# Patient Record
Sex: FEMALE | Race: AMERICAN INDIAN OR ALASKA NATIVE
[De-identification: names, ages, dates, MRNs, and addresses within clinical notes are randomized per-mention and may not be internally consistent; named-entity substitution may affect disease eponyms.]

---

## 2020-11-11 ENCOUNTER — HOSPITAL ENCOUNTER (INPATIENT)
Dept: HOSPITAL 5 - ED | Age: 80
LOS: 9 days | Discharge: HOME | DRG: 871 | End: 2020-11-20
Attending: INTERNAL MEDICINE | Admitting: INTERNAL MEDICINE
Payer: MEDICARE

## 2020-11-11 DIAGNOSIS — Z79.899: ICD-10-CM

## 2020-11-11 DIAGNOSIS — J12.89: ICD-10-CM

## 2020-11-11 DIAGNOSIS — J96.01: ICD-10-CM

## 2020-11-11 DIAGNOSIS — M19.90: ICD-10-CM

## 2020-11-11 DIAGNOSIS — A41.89: Primary | ICD-10-CM

## 2020-11-11 DIAGNOSIS — I10: ICD-10-CM

## 2020-11-11 DIAGNOSIS — R65.20: ICD-10-CM

## 2020-11-11 DIAGNOSIS — F03.90: ICD-10-CM

## 2020-11-11 DIAGNOSIS — Z85.3: ICD-10-CM

## 2020-11-11 DIAGNOSIS — U07.1: ICD-10-CM

## 2020-11-11 DIAGNOSIS — Z82.49: ICD-10-CM

## 2020-11-11 DIAGNOSIS — Z86.73: ICD-10-CM

## 2020-11-11 LAB
ALBUMIN SERPL-MCNC: 3.4 G/DL (ref 3.9–5)
ALT SERPL-CCNC: 13 UNITS/L (ref 7–56)
BACTERIA #/AREA URNS HPF: (no result) /HPF
BASOPHILS # (AUTO): 0.1 K/MM3 (ref 0–0.1)
BASOPHILS NFR BLD AUTO: 0.8 % (ref 0–1.8)
BILIRUB UR QL STRIP: (no result)
BLOOD UR QL VISUAL: (no result)
BUN SERPL-MCNC: 19 MG/DL (ref 7–17)
BUN/CREAT SERPL: 19 %
CALCIUM SERPL-MCNC: 8.5 MG/DL (ref 8.4–10.2)
CRP SERPL-MCNC: 8 MG/DL (ref 0–1.3)
EOSINOPHIL # BLD AUTO: 0 K/MM3 (ref 0–0.4)
EOSINOPHIL NFR BLD AUTO: 0 % (ref 0–4.3)
HCT VFR BLD CALC: 32 % (ref 30.3–42.9)
HDLC SERPL-MCNC: 48 MG/DL (ref 40–59)
HEMOLYSIS INDEX: 9
HGB BLD-MCNC: 10.5 GM/DL (ref 10.1–14.3)
LYMPHOCYTES # BLD AUTO: 1.2 K/MM3 (ref 1.2–5.4)
LYMPHOCYTES NFR BLD AUTO: 9.2 % (ref 13.4–35)
MCHC RBC AUTO-ENTMCNC: 33 % (ref 30–34)
MCV RBC AUTO: 91 FL (ref 79–97)
MONOCYTES # (AUTO): 0.9 K/MM3 (ref 0–0.8)
MONOCYTES % (AUTO): 6.8 % (ref 0–7.3)
MUCOUS THREADS #/AREA URNS HPF: (no result) /HPF
PH UR STRIP: 5 [PH] (ref 5–7)
PLATELET # BLD: 276 K/MM3 (ref 140–440)
RBC # BLD AUTO: 3.52 M/MM3 (ref 3.65–5.03)
RBC #/AREA URNS HPF: 3 /HPF (ref 0–6)
UROBILINOGEN UR-MCNC: 2 MG/DL (ref ?–2)
WBC #/AREA URNS HPF: 1 /HPF (ref 0–6)

## 2020-11-11 PROCEDURE — 82962 GLUCOSE BLOOD TEST: CPT

## 2020-11-11 PROCEDURE — 96361 HYDRATE IV INFUSION ADD-ON: CPT

## 2020-11-11 PROCEDURE — 87116 MYCOBACTERIA CULTURE: CPT

## 2020-11-11 PROCEDURE — 86140 C-REACTIVE PROTEIN: CPT

## 2020-11-11 PROCEDURE — 81001 URINALYSIS AUTO W/SCOPE: CPT

## 2020-11-11 PROCEDURE — 71250 CT THORAX DX C-: CPT

## 2020-11-11 PROCEDURE — 83880 ASSAY OF NATRIURETIC PEPTIDE: CPT

## 2020-11-11 PROCEDURE — 82728 ASSAY OF FERRITIN: CPT

## 2020-11-11 PROCEDURE — 70450 CT HEAD/BRAIN W/O DYE: CPT

## 2020-11-11 PROCEDURE — 80061 LIPID PANEL: CPT

## 2020-11-11 PROCEDURE — U0003 INFECTIOUS AGENT DETECTION BY NUCLEIC ACID (DNA OR RNA); SEVERE ACUTE RESPIRATORY SYNDROME CORONAVIRUS 2 (SARS-COV-2) (CORONAVIRUS DISEASE [COVID-19]), AMPLIFIED PROBE TECHNIQUE, MAKING USE OF HIGH THROUGHPUT TECHNOLOGIES AS DESCRIBED BY CMS-2020-01-R: HCPCS

## 2020-11-11 PROCEDURE — 85379 FIBRIN DEGRADATION QUANT: CPT

## 2020-11-11 PROCEDURE — 85025 COMPLETE CBC W/AUTO DIFF WBC: CPT

## 2020-11-11 PROCEDURE — 96368 THER/DIAG CONCURRENT INF: CPT

## 2020-11-11 PROCEDURE — 83615 LACTATE (LD) (LDH) ENZYME: CPT

## 2020-11-11 PROCEDURE — 96375 TX/PRO/DX INJ NEW DRUG ADDON: CPT

## 2020-11-11 PROCEDURE — 80053 COMPREHEN METABOLIC PANEL: CPT

## 2020-11-11 PROCEDURE — 84484 ASSAY OF TROPONIN QUANT: CPT

## 2020-11-11 PROCEDURE — 36415 COLL VENOUS BLD VENIPUNCTURE: CPT

## 2020-11-11 PROCEDURE — 96365 THER/PROPH/DIAG IV INF INIT: CPT

## 2020-11-11 PROCEDURE — 96376 TX/PRO/DX INJ SAME DRUG ADON: CPT

## 2020-11-11 PROCEDURE — 71045 X-RAY EXAM CHEST 1 VIEW: CPT

## 2020-11-11 PROCEDURE — 96367 TX/PROPH/DG ADDL SEQ IV INF: CPT

## 2020-11-11 PROCEDURE — 94760 N-INVAS EAR/PLS OXIMETRY 1: CPT

## 2020-11-11 PROCEDURE — 87040 BLOOD CULTURE FOR BACTERIA: CPT

## 2020-11-11 PROCEDURE — 72125 CT NECK SPINE W/O DYE: CPT

## 2020-11-11 PROCEDURE — 84145 PROCALCITONIN (PCT): CPT

## 2020-11-11 PROCEDURE — 82140 ASSAY OF AMMONIA: CPT

## 2020-11-11 RX ADMIN — AZITHROMYCIN SCH MLS/HR: 500 INJECTION, POWDER, LYOPHILIZED, FOR SOLUTION INTRAVENOUS at 15:43

## 2020-11-11 RX ADMIN — METHYLPREDNISOLONE SODIUM SUCCINATE SCH MG: 40 INJECTION, POWDER, FOR SOLUTION INTRAMUSCULAR; INTRAVENOUS at 14:11

## 2020-11-11 RX ADMIN — METHYLPREDNISOLONE SODIUM SUCCINATE SCH MG: 40 INJECTION, POWDER, FOR SOLUTION INTRAMUSCULAR; INTRAVENOUS at 23:00

## 2020-11-11 RX ADMIN — Medication SCH ML: at 23:00

## 2020-11-11 RX ADMIN — HEPARIN SODIUM SCH UNIT: 5000 INJECTION, SOLUTION INTRAVENOUS; SUBCUTANEOUS at 23:00

## 2020-11-11 NOTE — CAT SCAN REPORT
CT CERVICAL SPINE WITHOUT CONTRAST



INDICATION / CLINICAL INFORMATION:

Patient fell sustaining neck injury. Altered mental status.



TECHNIQUE:

Axial CT images were obtained through the cervical spine. Sagittal and coronal reformatted images wer
e produced. All CT scans at this location are performed using CT dose reduction for ALARA by means of
 automated exposure control. 



COMPARISON:

None available.



FINDINGS:



ALIGNMENT: There is a mild rightward curvature of the cervical spine without definite scoliosis. Loss
 of the normal cervical lordosis is noted. There is no indication of traumatic subluxation.



VERTEBRAE: No evidence of fracture. Prominent anterior osteophyte formation is observed at the C5, C6
 and C7 vertebrae. Milder posterior osteophyte formation is also observed at the same levels.



DISC SPACES: Loss of disc height is demonstrated at the C5-6 and C6-7 levels.



DEGENERATIVE CHANGES: Mild loss of disc height, anterior osteophyte formation, mild posterior osteoph
yte formation and right worse than left uncovertebral arthropathy are observed at the C5-6 and C6-7 l
evels. Bilateral neuroforaminal narrowing is seen at these locations. Central spinal canal is adequat
ely maintained.





CRANIOCERVICAL JUNCTION:No significant abnormality.



SPINAL CANAL: Central spinal canal is adequately maintained throughout.



PARASPINAL SOFT TISSUES: No significant abnormality. 



ADDITIONAL FINDINGS: None.



LUNG APICES: Lung apices are largely excluded from this study.



IMPRESSION:

1. No indication of fracture or traumatic subluxation.

2. Degenerative changes at C5-6 and C6-7 levels as described above.









Signer Name: Jason Up MD 

Signed: 11/11/2020 11:52 AM

Workstation Name: [id number]

## 2020-11-11 NOTE — XRAY REPORT
CHEST 1 VIEW 



INDICATION:  fever hypoxia.



COMPARISON:  None



FINDINGS:

Support devices: None. 



Heart: Borderline to mild cardiomegaly 

Lungs/Pleura: Mild airspace opacity is identified throughout the right lung and left lower lobe. This
 could represent infiltrates or congestive changes. No large pleural effusion or pneumothorax. 



Additional findings: None.



IMPRESSION:

 Borderline to mild cardiomegaly. Bilateral congestive changes or infiltrates as described. Viral inf
ection is not excluded.



Signer Name: Bolivar Corral Jr, MD 

Signed: 11/11/2020 11:42 AM

Workstation Name: [id number]

## 2020-11-11 NOTE — CAT SCAN REPORT
CT CHEST WITHOUT CONTRAST



INDICATION / CLINICAL INFORMATION:

fever hypoxia.



TECHNIQUE:

Axial CT images were obtained through the chest without contrast. All CT scans at this location are p
erformed using CT dose reduction for ALARA by means of automated exposure control. 



COMPARISON:

Chest radiograph from same day.



FINDINGS:



THORACIC AORTA: Mild atherosclerotic calcification without acute abnormality. 

HEART: The heart is enlarged. No pericardial effusion. Coronary artery calcifications.

MEDIASTINUM / ROBER: Evaluation for hilar adenopathy is limited without venous contrast. Scattered mil
dly enlarged mediastinal lymph nodes are likely reactive.

LUNGS/PLEURA: There are small bilateral pleural effusions with dense bilateral airspace consolidation
s, slightly more pronounced on the right. There is diffuse interlobular septal thickening. No pneumot
horax.

ADDITIONAL CHEST FINDINGS: None.



UPPER ABDOMEN: No significant abnormality.



SKELETAL SYSTEM: Incidentally, there is a 2.3 x 1.5 cm geographic lytic lesion within the anterior pa
rasymphyseal mandible, just to the left of midline. This is incompletely imaged on current study.



IMPRESSION:



1. Findings most compatible with CHF/fluid overload with pulmonary edema and small bilateral pleural 
effusions. Superimposed pneumonia is unable to be excluded.

2. Incidentally noted 2.3 cm lytic lesion within the anterior mandible. This is incompletely imaged a
nd may represent a dentigerous cyst. Recommend comparison to outside imaging to assess stability of t
his finding. If no prior imaging is available for comparison, consider further evaluation with MRI.



Signer Name: Jeferson Barrera MD 

Signed: 11/11/2020 12:11 PM

Workstation Name: [id number]

## 2020-11-11 NOTE — CAT SCAN REPORT
CT HEAD WITHOUT CONTRAST



INDICATION / CLINICAL INFORMATION:  fall altered mental status.



TECHNIQUE: Axial imaging performed from the skull apex through the skull base without the use of cont
rast.  Sagittal and coronal reformatted images.  All CT scans at this location are performed using CT
 dose reduction for ALARA by means of automated exposure control. 



COMPARISON: None available.



FINDINGS:



CEREBRAL PARENCHYMA: Mild diffuse volume loss and mild to moderate chronic white matter changes are n
oted. No acute parenchymal abnormality is appreciated. Chronic focal infarct in the right subinsular 
region measures 2.2 x 0.7 cm. Chronic infarct in the medial right occipital lobe measures 3.0 x 1.7 c
m.

HEMORRHAGE: None.

EXTRA-AXIAL SPACES: Normal in size and morphology for the patient's age.

VENTRICULAR SYSTEM: Normal in size and morphology for the patient's age.

MIDLINE SHIFT OR HERNIATION: None.



CEREBELLUM / BRAINSTEM: No significant abnormality.



CALVARIUM: No significant abnormality.

ORBITS: Normal as visualized.

PARANASAL SINUSES / MASTOID AIR CELLS: Normal as visualized.

SOFT TISSUES of HEAD: No significant abnormality.



ADDITIONAL FINDINGS: None.



IMPRESSION:

No acute intracranial abnormality. Volume loss and chronic white matter changes. Chronic infarcts in 
the right subinsular region and medial right occipital lobe.



Signer Name: Bolivar Corral Jr, MD 

Signed: 11/11/2020 11:19 AM

Workstation Name: [id number]

## 2020-11-11 NOTE — EMERGENCY DEPARTMENT REPORT
ED Altered Mental Status HPI





- General


Chief Complaint: Altered Mental Status


Stated Complaint: STROKE


PUI?: Yes


Time Seen by Provider: 11/11/20 09:21


Source: family (daughter bedside), EMS, old records reviewed


Mode of arrival: Stretcher


Limitations: Other





- History of Present Illness


Initial Comments: 





CC: altered mental status





HPI:  This is a 81 yo female with hx of dementia, TIA, breast cancer in 

remission who presents with low oxygen saturation at home, as well as, altered 

mental status.  On Saturday, 4 days prior, patient fell in shower.  No notable 

head trauma according to daughter who is bedside.  Yesterday, patient has low 

grade fever and work of breathing.  Home pulse oximetry machine read low.  PCP 

ordered oxygen to be delivered to the home.  





Upon EMS arrival this morning, patient was altered.  Slight right facial droop 

noticed by daughter.  Patient was altered.  She was less talkative.  Appeared to

have slurred speech.  EMS did not detect facial droop or extremity weakness.  

Pulse oximetry measured to be profoundly hypoxic at 36% on room air according to

EMS.


MD Complaint: altered mental status, confusion


-: days(s) (Fever hypoxia beginning yesterday), This morning (Altered mental 

status began this morning)


Severity: severe


Consistency of Symptoms: waxing and waning


Context: recent fever


Associated Symptoms: other (Hard to obtain due to dementia)





- Related Data


                                Home Medications











 Medication  Instructions  Recorded  Confirmed  Last Taken


 


Amlodipine Besylate [Norvasc] 10 mg PO DAILY 11/11/20 11/11/20 Unknown


 


Anastrozole 1 mg PO DAILY 11/11/20 11/11/20 Unknown


 


AtorvaSTATin [Lipitor] 20 mg PO DAILY 11/11/20 11/11/20 Unknown


 


Clopidogrel [Plavix] 75 mg PO DAILY 11/11/20 11/11/20 Unknown


 


Fluticasone/Vilanterol [Breo 1 spray IH Q4HR 11/11/20 11/11/20 Unknown





Ellipta 100-25 Mcg INH]    


 


Losartan Potassium 25 mg PO DAILY 11/11/20 11/11/20 Unknown


 


Memantine HCl 10 mg PO BID 11/11/20 11/11/20 Unknown


 


Metformin HCl [Glucophage Xr] 500 mg PO BID 11/11/20 11/11/20 Unknown


 


Quetiapine Fumarate [SEROquel XR] 25 mg PO QDAY 11/11/20 11/11/20 Unknown


 


Tramadol HCl 50 mg PO Q4H PRN 11/11/20 11/11/20 Unknown











                                    Allergies











Allergy/AdvReac Type Severity Reaction Status Date / Time


 


No Known Allergies Allergy   Verified 11/11/20 09:25














ED Review of Systems


ROS: 


Stated complaint: STROKE


Other details as noted in HPI





Comment: Unobtainable due to pts medical conditions (Unobtainable due to acute 

altered mental status and dementia)





ED Past Medical Hx





- Past Medical History


Previous Medical History?: Yes


Hx Hypertension: Yes (FOR 10 YRS, DR. OLIVARES- PCP)


Hx Diabetes: Yes (FOR 1 YR)


Hx of Cancer: Yes (breast)


Hx Sickle Cell Disease: No


Hx Arthritis: Yes (IN KNEES)


Hx HIV: No





- Surgical History


Hx Breast Surgery: Yes (LEFT BREAST BX 10/2016)





- Social History


Smoking Status: Never Smoker


Substance Use Type: None





- Medications


Home Medications: 


                                Home Medications











 Medication  Instructions  Recorded  Confirmed  Last Taken  Type


 


Amlodipine Besylate [Norvasc] 10 mg PO DAILY 11/11/20 11/11/20 Unknown History


 


Anastrozole 1 mg PO DAILY 11/11/20 11/11/20 Unknown History


 


AtorvaSTATin [Lipitor] 20 mg PO DAILY 11/11/20 11/11/20 Unknown History


 


Clopidogrel [Plavix] 75 mg PO DAILY 11/11/20 11/11/20 Unknown History


 


Fluticasone/Vilanterol [Breo 1 spray IH Q4HR 11/11/20 11/11/20 Unknown History





Ellipta 100-25 Mcg INH]     


 


Losartan Potassium 25 mg PO DAILY 11/11/20 11/11/20 Unknown History


 


Memantine HCl 10 mg PO BID 11/11/20 11/11/20 Unknown History


 


Metformin HCl [Glucophage Xr] 500 mg PO BID 11/11/20 11/11/20 Unknown History


 


Quetiapine Fumarate [SEROquel XR] 25 mg PO QDAY 11/11/20 11/11/20 Unknown 

History


 


Tramadol HCl 50 mg PO Q4H PRN 11/11/20 11/11/20 Unknown History














ED Physical Exam





- General


Limitations: Other


General appearance: alert, in no apparent distress, other (Patient will make eye

contact, she is moving all 4 extremities, she would not speak or follow 

instructions)





- Head


Head exam: Present: atraumatic, normocephalic





- Eye


Eye exam: Present: normal appearance





- ENT


ENT exam: Present: mucous membranes dry, other (No oropharyngeal lesions or 

edema)





- Neck


Neck exam: Present: normal inspection, full ROM





- Respiratory


Respiratory exam: Present: decreased breath sounds, other (Mild work of 

breathing, breathing 35 breaths/min).  Absent: wheezes, rales, rhonchi, 

accessory muscle use





- Cardiovascular


Cardiovascular Exam: Present: normal rhythm, tachycardia, normal heart sounds.  

Absent: systolic murmur, diastolic murmur, rubs, gallop





- GI/Abdominal


GI/Abdominal exam: Present: soft, normal bowel sounds.  Absent: distended, 

tenderness, guarding, rebound





- Extremities Exam


Extremities exam: Present: normal inspection





- Neurological Exam


Neurological exam: Present: altered, other (Patient will make eye contact but 

she would not speak.  She moves all 4 extremities briskly especially with IV 

attempt)





- Psychiatric


Psychiatric exam: Present: normal mood, flat affect





- Skin


Skin exam: Present: warm, dry, intact, normal color, other (Warm to touch).  

Absent: rash





ED Course





                                   Vital Signs











  11/11/20 11/11/20 11/11/20





  09:09 09:15 09:16


 


Temperature  97.4 F L 


 


Pulse Rate  78 98 H


 


Respiratory  22 14





Rate   


 


Blood Pressure 133/88 132/78 133/88


 


O2 Sat by Pulse  100 92





Oximetry   














  11/11/20 11/11/20 11/11/20





  09:30 09:45 10:01


 


Temperature   


 


Pulse Rate 99 H 95 H 97 H


 


Respiratory 13 40 H 30 H





Rate   


 


Blood Pressure 127/68 127/68 127/68


 


O2 Sat by Pulse 82 L 100 75 L





Oximetry   














  11/11/20 11/11/20 11/11/20





  10:15 10:31 10:45


 


Temperature   


 


Pulse Rate 98 H 100 H 98 H


 


Respiratory 14 37 H 23





Rate   


 


Blood Pressure 127/68 127/68 127/68


 


O2 Sat by Pulse 92 91 92





Oximetry   














  11/11/20 11/11/20 11/11/20





  11:11 11:15 11:30


 


Temperature   102.6 F H


 


Pulse Rate 102 H 99 H 


 


Respiratory 22 32 H 





Rate   


 


Blood Pressure 127/68 127/68 


 


O2 Sat by Pulse 82 L 90 





Oximetry   














  11/11/20 11/11/20 11/11/20





  11:31 11:45 12:01


 


Temperature   


 


Pulse Rate 99 H 96 H 101 H


 


Respiratory 30 H 33 H 19





Rate   


 


Blood Pressure 127/68 127/68 127/68


 


O2 Sat by Pulse 92 91 80 L





Oximetry   














  11/11/20 11/11/20





  12:15 12:31


 


Temperature  


 


Pulse Rate  92 H


 


Respiratory  15





Rate  


 


Blood Pressure 127/68 127/68


 


O2 Sat by Pulse 87 92





Oximetry  














- Lab Data


Result diagrams: 


                                 11/11/20 10:17





                                 11/11/20 10:17





                                   Lab Results











  11/11/20 11/11/20 11/11/20 Range/Units





  10:17 10:17 10:17 


 


WBC  13.1 H    (4.5-11.0)  K/mm3


 


RBC  3.52 L    (3.65-5.03)  M/mm3


 


Hgb  10.5    (10.1-14.3)  gm/dl


 


Hct  32.0    (30.3-42.9)  %


 


MCV  91    (79-97)  fl


 


MCH  30    (28-32)  pg


 


MCHC  33    (30-34)  %


 


RDW  14.7    (13.2-15.2)  %


 


Plt Count  276    (140-440)  K/mm3


 


Lymph % (Auto)  9.2 L    (13.4-35.0)  %


 


Mono % (Auto)  6.8    (0.0-7.3)  %


 


Eos % (Auto)  0.0    (0.0-4.3)  %


 


Baso % (Auto)  0.8    (0.0-1.8)  %


 


Lymph # (Auto)  1.2    (1.2-5.4)  K/mm3


 


Mono # (Auto)  0.9 H    (0.0-0.8)  K/mm3


 


Eos # (Auto)  0.0    (0.0-0.4)  K/mm3


 


Baso # (Auto)  0.1    (0.0-0.1)  K/mm3


 


Seg Neutrophils %  83.2 H    (40.0-70.0)  %


 


Seg Neutrophils #  10.9 H    (1.8-7.7)  K/mm3


 


Sodium   138   (137-145)  mmol/L


 


Potassium   3.0 L   (3.6-5.0)  mmol/L


 


Chloride   99.4   ()  mmol/L


 


Carbon Dioxide   26   (22-30)  mmol/L


 


Anion Gap   16   mmol/L


 


BUN   19 H   (7-17)  mg/dL


 


Creatinine   1.0   (0.6-1.2)  mg/dL


 


Estimated GFR   > 60   ml/min


 


BUN/Creatinine Ratio   19   %


 


Glucose   173 H   ()  mg/dL


 


Lactic Acid    2.20 H*  (0.7-2.0)  mmol/L


 


Calcium   8.5   (8.4-10.2)  mg/dL


 


Ferritin     (10.0-200.0)  ng/mL


 


Total Bilirubin   1.30 H   (0.1-1.2)  mg/dL


 


AST   25   (5-40)  units/L


 


ALT   13   (7-56)  units/L


 


Alkaline Phosphatase   65   ()  units/L


 


Lactate Dehydrogenase     ()  units/L


 


Troponin T   0.048 H   (0.00-0.029)  ng/mL


 


C-Reactive Protein     (0.00-1.30)  mg/dL


 


Total Protein   6.7   (6.3-8.2)  g/dL


 


Albumin   3.4 L   (3.9-5)  g/dL


 


Albumin/Globulin Ratio   1.0   %


 


Triglycerides   72   (2-149)  mg/dL


 


Cholesterol   120   ()  mg/dL


 


LDL Cholesterol Direct   62   ()  mg/dL


 


HDL Cholesterol   48   (40-59)  mg/dL


 


Cholesterol/HDL Ratio   2.50   %


 


Procalcitonin     (<0.15)  ng/mL


 


Urine Color     (Yellow)  


 


Urine Turbidity     (Clear)  


 


Urine pH     (5.0-7.0)  


 


Ur Specific Gravity     (1.003-1.030)  


 


Urine Protein     (Negative)  mg/dL


 


Urine Glucose (UA)     (Negative)  mg/dL


 


Urine Ketones     (Negative)  mg/dL


 


Urine Blood     (Negative)  


 


Urine Nitrite     (Negative)  


 


Urine Bilirubin     (Negative)  


 


Urine Urobilinogen     (<2.0)  mg/dL


 


Ur Leukocyte Esterase     (Negative)  


 


Urine WBC (Auto)     (0.0-6.0)  /HPF


 


Urine RBC (Auto)     (0.0-6.0)  /HPF


 


Urine Bacteria (Auto)     (Negative)  /HPF


 


Urine Mucus     /HPF














  11/11/20 11/11/20 11/11/20 Range/Units





  10:17 10:17 10:17 


 


WBC     (4.5-11.0)  K/mm3


 


RBC     (3.65-5.03)  M/mm3


 


Hgb     (10.1-14.3)  gm/dl


 


Hct     (30.3-42.9)  %


 


MCV     (79-97)  fl


 


MCH     (28-32)  pg


 


MCHC     (30-34)  %


 


RDW     (13.2-15.2)  %


 


Plt Count     (140-440)  K/mm3


 


Lymph % (Auto)     (13.4-35.0)  %


 


Mono % (Auto)     (0.0-7.3)  %


 


Eos % (Auto)     (0.0-4.3)  %


 


Baso % (Auto)     (0.0-1.8)  %


 


Lymph # (Auto)     (1.2-5.4)  K/mm3


 


Mono # (Auto)     (0.0-0.8)  K/mm3


 


Eos # (Auto)     (0.0-0.4)  K/mm3


 


Baso # (Auto)     (0.0-0.1)  K/mm3


 


Seg Neutrophils %     (40.0-70.0)  %


 


Seg Neutrophils #     (1.8-7.7)  K/mm3


 


Sodium     (137-145)  mmol/L


 


Potassium     (3.6-5.0)  mmol/L


 


Chloride     ()  mmol/L


 


Carbon Dioxide     (22-30)  mmol/L


 


Anion Gap     mmol/L


 


BUN     (7-17)  mg/dL


 


Creatinine     (0.6-1.2)  mg/dL


 


Estimated GFR     ml/min


 


BUN/Creatinine Ratio     %


 


Glucose     ()  mg/dL


 


Lactic Acid     (0.7-2.0)  mmol/L


 


Calcium     (8.4-10.2)  mg/dL


 


Ferritin   207.0 H   (10.0-200.0)  ng/mL


 


Total Bilirubin     (0.1-1.2)  mg/dL


 


AST     (5-40)  units/L


 


ALT     (7-56)  units/L


 


Alkaline Phosphatase     ()  units/L


 


Lactate Dehydrogenase  338 H    ()  units/L


 


Troponin T     (0.00-0.029)  ng/mL


 


C-Reactive Protein  8.00 H    (0.00-1.30)  mg/dL


 


Total Protein     (6.3-8.2)  g/dL


 


Albumin     (3.9-5)  g/dL


 


Albumin/Globulin Ratio     %


 


Triglycerides     (2-149)  mg/dL


 


Cholesterol     ()  mg/dL


 


LDL Cholesterol Direct     ()  mg/dL


 


HDL Cholesterol     (40-59)  mg/dL


 


Cholesterol/HDL Ratio     %


 


Procalcitonin    0.16  (<0.15)  ng/mL


 


Urine Color     (Yellow)  


 


Urine Turbidity     (Clear)  


 


Urine pH     (5.0-7.0)  


 


Ur Specific Gravity     (1.003-1.030)  


 


Urine Protein     (Negative)  mg/dL


 


Urine Glucose (UA)     (Negative)  mg/dL


 


Urine Ketones     (Negative)  mg/dL


 


Urine Blood     (Negative)  


 


Urine Nitrite     (Negative)  


 


Urine Bilirubin     (Negative)  


 


Urine Urobilinogen     (<2.0)  mg/dL


 


Ur Leukocyte Esterase     (Negative)  


 


Urine WBC (Auto)     (0.0-6.0)  /HPF


 


Urine RBC (Auto)     (0.0-6.0)  /HPF


 


Urine Bacteria (Auto)     (Negative)  /HPF


 


Urine Mucus     /HPF














  11/11/20 Range/Units





  11:30 


 


WBC   (4.5-11.0)  K/mm3


 


RBC   (3.65-5.03)  M/mm3


 


Hgb   (10.1-14.3)  gm/dl


 


Hct   (30.3-42.9)  %


 


MCV   (79-97)  fl


 


MCH   (28-32)  pg


 


MCHC   (30-34)  %


 


RDW   (13.2-15.2)  %


 


Plt Count   (140-440)  K/mm3


 


Lymph % (Auto)   (13.4-35.0)  %


 


Mono % (Auto)   (0.0-7.3)  %


 


Eos % (Auto)   (0.0-4.3)  %


 


Baso % (Auto)   (0.0-1.8)  %


 


Lymph # (Auto)   (1.2-5.4)  K/mm3


 


Mono # (Auto)   (0.0-0.8)  K/mm3


 


Eos # (Auto)   (0.0-0.4)  K/mm3


 


Baso # (Auto)   (0.0-0.1)  K/mm3


 


Seg Neutrophils %   (40.0-70.0)  %


 


Seg Neutrophils #   (1.8-7.7)  K/mm3


 


Sodium   (137-145)  mmol/L


 


Potassium   (3.6-5.0)  mmol/L


 


Chloride   ()  mmol/L


 


Carbon Dioxide   (22-30)  mmol/L


 


Anion Gap   mmol/L


 


BUN   (7-17)  mg/dL


 


Creatinine   (0.6-1.2)  mg/dL


 


Estimated GFR   ml/min


 


BUN/Creatinine Ratio   %


 


Glucose   ()  mg/dL


 


Lactic Acid   (0.7-2.0)  mmol/L


 


Calcium   (8.4-10.2)  mg/dL


 


Ferritin   (10.0-200.0)  ng/mL


 


Total Bilirubin   (0.1-1.2)  mg/dL


 


AST   (5-40)  units/L


 


ALT   (7-56)  units/L


 


Alkaline Phosphatase   ()  units/L


 


Lactate Dehydrogenase   ()  units/L


 


Troponin T   (0.00-0.029)  ng/mL


 


C-Reactive Protein   (0.00-1.30)  mg/dL


 


Total Protein   (6.3-8.2)  g/dL


 


Albumin   (3.9-5)  g/dL


 


Albumin/Globulin Ratio   %


 


Triglycerides   (2-149)  mg/dL


 


Cholesterol   ()  mg/dL


 


LDL Cholesterol Direct   ()  mg/dL


 


HDL Cholesterol   (40-59)  mg/dL


 


Cholesterol/HDL Ratio   %


 


Procalcitonin   (<0.15)  ng/mL


 


Urine Color  Yellow  (Yellow)  


 


Urine Turbidity  Clear  (Clear)  


 


Urine pH  5.0  (5.0-7.0)  


 


Ur Specific Gravity  1.025  (1.003-1.030)  


 


Urine Protein  100 mg/dl  (Negative)  mg/dL


 


Urine Glucose (UA)  Neg  (Negative)  mg/dL


 


Urine Ketones  Neg  (Negative)  mg/dL


 


Urine Blood  Neg  (Negative)  


 


Urine Nitrite  Neg  (Negative)  


 


Urine Bilirubin  Neg  (Negative)  


 


Urine Urobilinogen  2.0  (<2.0)  mg/dL


 


Ur Leukocyte Esterase  Tr  (Negative)  


 


Urine WBC (Auto)  1.0  (0.0-6.0)  /HPF


 


Urine RBC (Auto)  3.0  (0.0-6.0)  /HPF


 


Urine Bacteria (Auto)  1+  (Negative)  /HPF


 


Urine Mucus  Few  /HPF














- Radiology Data


Radiology results: report reviewed, image reviewed





CT head chronic changes without acute process, CT cervical spine no acute 

traumatic injury





CT chest: Diffuse airspace disease edema versus infection





AP portable chest radiograph according to my interpretation: Right-sided 

predominance of large infiltrative process with left lower lobe infiltrate





- Medical Decision Making





Ms. Romo is a 80-year-old female presents with fever rectal temp 102.6 

Fahrenheit here in emergency department as well as severe hypoxia 75% on room 

air.  Patient has significant infiltrative airspace disease on chest radiograph 

and CT chest indicative of multifocal pneumonia.  Patient is tolerating Venturi 

mask at this time.  She received antibiotics in emergency department.  COVID-19 

precautions instituted.  Viral versus bacterial infections are both being 

considered.





Patient likely has underlying lung disease such as COPD.  BREO is included on 

her medication list.





I suspect patient's abdominal status is due to acute delirium caused by 

infection and hypoxia.  I do not suspect acute CVA.


Critical care attestation.: 


If time is entered above; I have spent that time in minutes in the direct care 

of this critically ill patient, excluding procedure time.








ED Disposition


Clinical Impression: 


 Acute respiratory failure with hypoxia, Multifocal pneumonia, Suspected COVID-

19 virus infection





Disposition: DC-09 OP ADMIT IP TO THIS HOSP


Is pt being admited?: Yes


Does the pt Need Aspirin: No


Condition: Stable


Instructions:  Bacterial Pneumonia (ED)

## 2020-11-11 NOTE — HISTORY AND PHYSICAL REPORT
History of Present Illness


Chief complaint: 


She is getting weaker and her oxygen level was low





History of present illness: 


79 YO Female with Obesity Hypoventilation Syndrome, HLD, HTN, Vascular Dementia,

Cerebral Atherosclerosis, BrCa, CVA presents to ED for evaluation.  The patient 

is confused and lethargic with diminished cognition and is unable to provide 

detailed history.  Patient daughter who is at bedside provides detailed history.

 As per daughter the patient has experienced progressive weakness and confusion 

over the past 1 month with worsening symptoms over the past 1 week.  Patient has

decreased mobility, and increased bedbound status.  Patient has gait instability

due to lower extremity weakness which resulted in a fall on Saturday.  Patient 

has a palliative performance score of 40% and requires 5/6 assistance with 

activities of daily living.  Patient was found to have a pulse oximetry in the 

80s this morning.  EMS was notified and upon arrival the patient was found to be

in distress.  Patient placed on supplemental oxygen and transported to [facility] for 

further care and evaluation of the aforementioned symptoms.  Patient seen and 

evaluated in the emergency department.  All lab and imaging studies reviewed.  

Patient found to have a pulse oximetry of 70% on room air which is consistent 

with acute hypoxemic respiratory failure.  Patient also found to have a chest x-

ray with bilateral pneumonia which is complicated by systemic inflammatory 

response syndrome.  Patient initiated on pneumonia protocol as well as 

coronavirus protocol while in the emergency department.  Patient admitted to 

medical floor and initiated on pneumonia protocol as well as coronavirus 

protocol.  No further history is obtainable.  Patient remains confused and 

lethargic at the time of my evaluation but the patient has a positive gag reflex

and is able to protect her airway without difficulty.  Advanced care planning 

conducted in ED.





Past History


Past Medical History: cancer, hypertension, hyperlipidemia, stroke


Past Surgical History: Other (Breast surgery)


Social history: .  denies: smoking, alcohol abuse, prescription drug 

abuse


Family history: hypertension





Medications and Allergies


                                    Allergies











Allergy/AdvReac Type Severity Reaction Status Date / Time


 


No Known Allergies Allergy   Verified 11/11/20 09:25











                                Home Medications











 Medication  Instructions  Recorded  Confirmed  Last Taken  Type


 


Amlodipine Besylate [Norvasc] 10 mg PO DAILY 11/11/20 11/11/20 Unknown History


 


Anastrozole 1 mg PO DAILY 11/11/20 11/11/20 Unknown History


 


AtorvaSTATin [Lipitor] 20 mg PO DAILY 11/11/20 11/11/20 Unknown History


 


Clopidogrel [Plavix] 75 mg PO DAILY 11/11/20 11/11/20 Unknown History


 


Fluticasone/Vilanterol [Breo 1 spray IH Q4HR 11/11/20 11/11/20 Unknown History





Ellipta 100-25 Mcg INH]     


 


Losartan Potassium 25 mg PO DAILY 11/11/20 11/11/20 Unknown History


 


Memantine HCl 10 mg PO BID 11/11/20 11/11/20 Unknown History


 


Metformin HCl [Glucophage Xr] 500 mg PO BID 11/11/20 11/11/20 Unknown History


 


Quetiapine Fumarate [SEROquel XR] 25 mg PO QDAY 11/11/20 11/11/20 Unknown 

History


 


Tramadol HCl 50 mg PO Q4H PRN 11/11/20 11/11/20 Unknown History














Review of Systems


ROS unobtainable: due to mental status





Exam





- Constitutional


Vitals: 


                                        











Temp Pulse Resp BP Pulse Ox


 


 102.6 F H  88   21   127/68   94 


 


 11/11/20 11:30  11/11/20 13:01  11/11/20 13:01  11/11/20 13:01  11/11/20 13:01











General appearance: Present: mild distress





- EENT


Eyes: Present: PERRL


ENT: clear oral mucosa, hearing decreased





- Neck


Neck: Present: supple, normal ROM





- Respiratory


Respiratory effort: labored, accessory muscle use, stridor


Respiratory: bilateral: diminished, rhonchi





- Cardiovascular


Heart Sounds: Present: S1 & S2.  Absent: rub, click





- Extremities


Extremities: pulses symmetrical, No edema


Peripheral Pulses: within normal limits





- Abdominal


General gastrointestinal: Present: soft, non-tender, non-distended, normal bowel

sounds


Female genitourinary: Present: normal





- Integumentary


Integumentary: Present: clear, warm, dry, clammy, decreased turgor





- Musculoskeletal


Musculoskeletal: generalized weakness





- Psychiatric


Psychiatric: no appropriate mood/affect, no intact judgment & insight, no memory

intact





- Neurologic


Neurologic: CNII-XII intact, moves all extremities, no gait normal





HEART Score





- HEART Score


Troponin: 


                                        











Troponin T  0.048 ng/mL (0.00-0.029)  H  11/11/20  10:17    














Results





- Labs


CBC & Chem 7: 


                                 11/11/20 10:17





                                 11/11/20 10:17


Labs: 


                              Abnormal lab results











  11/11/20 11/11/20 11/11/20 Range/Units





  10:17 10:17 10:17 


 


WBC  13.1 H    (4.5-11.0)  K/mm3


 


RBC  3.52 L    (3.65-5.03)  M/mm3


 


Lymph % (Auto)  9.2 L    (13.4-35.0)  %


 


Mono # (Auto)  0.9 H    (0.0-0.8)  K/mm3


 


Seg Neutrophils %  83.2 H    (40.0-70.0)  %


 


Seg Neutrophils #  10.9 H    (1.8-7.7)  K/mm3


 


D-Dimer     (0-234)  ng/mlDDU


 


Potassium   3.0 L   (3.6-5.0)  mmol/L


 


BUN   19 H   (7-17)  mg/dL


 


Glucose   173 H   ()  mg/dL


 


Lactic Acid    2.20 H*  (0.7-2.0)  mmol/L


 


Ferritin     (10.0-200.0)  ng/mL


 


Total Bilirubin   1.30 H   (0.1-1.2)  mg/dL


 


Lactate Dehydrogenase     ()  units/L


 


Troponin T   0.048 H   (0.00-0.029)  ng/mL


 


C-Reactive Protein     (0.00-1.30)  mg/dL


 


NT-Pro-B Natriuret Pep     (0-900)  pg/mL


 


Albumin   3.4 L   (3.9-5)  g/dL














  11/11/20 11/11/20 11/11/20 Range/Units





  10:17 10:17 12:41 


 


WBC     (4.5-11.0)  K/mm3


 


RBC     (3.65-5.03)  M/mm3


 


Lymph % (Auto)     (13.4-35.0)  %


 


Mono # (Auto)     (0.0-0.8)  K/mm3


 


Seg Neutrophils %     (40.0-70.0)  %


 


Seg Neutrophils #     (1.8-7.7)  K/mm3


 


D-Dimer    1485.80 H  (0-234)  ng/mlDDU


 


Potassium     (3.6-5.0)  mmol/L


 


BUN     (7-17)  mg/dL


 


Glucose     ()  mg/dL


 


Lactic Acid     (0.7-2.0)  mmol/L


 


Ferritin   207.0 H   (10.0-200.0)  ng/mL


 


Total Bilirubin     (0.1-1.2)  mg/dL


 


Lactate Dehydrogenase  338 H    ()  units/L


 


Troponin T     (0.00-0.029)  ng/mL


 


C-Reactive Protein  8.00 H    (0.00-1.30)  mg/dL


 


NT-Pro-B Natriuret Pep     (0-900)  pg/mL


 


Albumin     (3.9-5)  g/dL














  11/11/20 Range/Units





  12:52 


 


WBC   (4.5-11.0)  K/mm3


 


RBC   (3.65-5.03)  M/mm3


 


Lymph % (Auto)   (13.4-35.0)  %


 


Mono # (Auto)   (0.0-0.8)  K/mm3


 


Seg Neutrophils %   (40.0-70.0)  %


 


Seg Neutrophils #   (1.8-7.7)  K/mm3


 


D-Dimer   (0-234)  ng/mlDDU


 


Potassium   (3.6-5.0)  mmol/L


 


BUN   (7-17)  mg/dL


 


Glucose   ()  mg/dL


 


Lactic Acid   (0.7-2.0)  mmol/L


 


Ferritin   (10.0-200.0)  ng/mL


 


Total Bilirubin   (0.1-1.2)  mg/dL


 


Lactate Dehydrogenase   ()  units/L


 


Troponin T   (0.00-0.029)  ng/mL


 


C-Reactive Protein   (0.00-1.30)  mg/dL


 


NT-Pro-B Natriuret Pep  8422 H  (0-900)  pg/mL


 


Albumin   (3.9-5)  g/dL














Assessment and Plan





- Patient Problems


(1) Acute respiratory failure with hypoxia


Current Visit: Yes   Status: Acute   


Plan to address problem: 


Chest x-ray, supplemental oxygen, nebulizer therapy, pulse oximetry, pulmonary 

toilet,








(2) Multifocal pneumonia


Current Visit: Yes   Status: Acute   


Plan to address problem: 


Pneumonia protocol: Chest x-ray, CBC, CMP, IV antibiotic therapy, nebulizer 

therapy, pulse oximetry,








(3) Systemic inflammatory response syndrome


Current Visit: Yes   Status: Acute   


Plan to address problem: 


CBC, CMP, IV antibiotic therapy, supportive care, repeat CBC in a.m.








(4) Suspected COVID-19 virus infection


Current Visit: Yes   Status: Acute   


Plan to address problem: 


Coronavirus protocol: Coronavirus PCR ordered and is pending at time of 

admission, isolation precautions, contact precautions, IV steroid therapy, IV 

antibiotic therapy, supportive care.








(5) DVT prophylaxis


Current Visit: Yes   Status: Acute   


Plan to address problem: 


SCD to bilateral lower extremities while in bed, prophylactic heparin








(6) Advance care planning


Current Visit: Yes   Status: Acute   


Plan to address problem: 


Disease education conducted, prognosis discussed, patient is full code, care 

plan discussed, patient daughter acknowledges understanding and agreement with 

care plan, +30 minutes.

## 2020-11-12 LAB
ALBUMIN SERPL-MCNC: 3.5 G/DL (ref 3.9–5)
ALT SERPL-CCNC: 12 UNITS/L (ref 7–56)
BASOPHILS # (AUTO): 0 K/MM3 (ref 0–0.1)
BASOPHILS NFR BLD AUTO: 0.3 % (ref 0–1.8)
BUN SERPL-MCNC: 20 MG/DL (ref 7–17)
BUN/CREAT SERPL: 22 %
CALCIUM SERPL-MCNC: 8.3 MG/DL (ref 8.4–10.2)
EOSINOPHIL # BLD AUTO: 0 K/MM3 (ref 0–0.4)
EOSINOPHIL NFR BLD AUTO: 0 % (ref 0–4.3)
HCT VFR BLD CALC: 31.2 % (ref 30.3–42.9)
HEMOLYSIS INDEX: 7
HGB BLD-MCNC: 10.4 GM/DL (ref 10.1–14.3)
LYMPHOCYTES # BLD AUTO: 0.9 K/MM3 (ref 1.2–5.4)
LYMPHOCYTES NFR BLD AUTO: 10 % (ref 13.4–35)
MCHC RBC AUTO-ENTMCNC: 33 % (ref 30–34)
MCV RBC AUTO: 91 FL (ref 79–97)
MONOCYTES # (AUTO): 0.2 K/MM3 (ref 0–0.8)
MONOCYTES % (AUTO): 1.7 % (ref 0–7.3)
PLATELET # BLD: 260 K/MM3 (ref 140–440)
RBC # BLD AUTO: 3.45 M/MM3 (ref 3.65–5.03)

## 2020-11-12 PROCEDURE — XW033E5 INTRODUCTION OF REMDESIVIR ANTI-INFECTIVE INTO PERIPHERAL VEIN, PERCUTANEOUS APPROACH, NEW TECHNOLOGY GROUP 5: ICD-10-PCS | Performed by: INTERNAL MEDICINE

## 2020-11-12 RX ADMIN — MEMANTINE HYDROCHLORIDE SCH MG: 10 TABLET ORAL at 01:00

## 2020-11-12 RX ADMIN — Medication SCH ML: at 13:10

## 2020-11-12 RX ADMIN — AZITHROMYCIN SCH MLS/HR: 500 INJECTION, POWDER, LYOPHILIZED, FOR SOLUTION INTRAVENOUS at 17:50

## 2020-11-12 RX ADMIN — METHYLPREDNISOLONE SODIUM SUCCINATE SCH MG: 40 INJECTION, POWDER, FOR SOLUTION INTRAMUSCULAR; INTRAVENOUS at 06:00

## 2020-11-12 RX ADMIN — MEMANTINE HYDROCHLORIDE SCH MG: 10 TABLET ORAL at 21:33

## 2020-11-12 RX ADMIN — HEPARIN SODIUM SCH UNIT: 5000 INJECTION, SOLUTION INTRAVENOUS; SUBCUTANEOUS at 21:35

## 2020-11-12 RX ADMIN — HEPARIN SODIUM SCH UNIT: 5000 INJECTION, SOLUTION INTRAVENOUS; SUBCUTANEOUS at 13:10

## 2020-11-12 RX ADMIN — METHYLPREDNISOLONE SODIUM SUCCINATE SCH MG: 40 INJECTION, POWDER, FOR SOLUTION INTRAMUSCULAR; INTRAVENOUS at 13:10

## 2020-11-12 RX ADMIN — Medication SCH ML: at 21:33

## 2020-11-12 RX ADMIN — MEMANTINE HYDROCHLORIDE SCH MG: 10 TABLET ORAL at 17:06

## 2020-11-12 RX ADMIN — SODIUM CHLORIDE SCH ML: 9 INJECTION, SOLUTION INTRAVENOUS at 21:35

## 2020-11-12 RX ADMIN — METHYLPREDNISOLONE SODIUM SUCCINATE SCH MG: 40 INJECTION, POWDER, FOR SOLUTION INTRAMUSCULAR; INTRAVENOUS at 21:33

## 2020-11-12 RX ADMIN — CEFTRIAXONE SODIUM SCH MLS/HR: 2 INJECTION, POWDER, FOR SOLUTION INTRAMUSCULAR; INTRAVENOUS at 17:05

## 2020-11-12 NOTE — PROGRESS NOTE
Assessment and Plan


Assessment and plan: 


-- Acute respiratory failure with hypoxia


Current Visit: Yes   Status: Acute   


Plan to address problem: 


Patient titrate O2 sats to more than 90%


Supportive care





-- Multifocal pneumonia


Current Visit: Yes   Status: Acute   


Plan to address problem: 


Empiric antibiotics with Rocephin and Zithromax


Follow cultures, supportive care





--Systemic inflammatory response syndrome


Current Visit: Yes   Status: Acute   


Plan to address problem: 


Follow cultures empiric antibiotics





--PUI suspected COVID-19 virus infection


Current Visit: Yes   Status: Acute   


Plan to address problem: 


Contact and droplet isolation


Follow corona PCR, inflammatory markers





--DVT prophylaxis


Current Visit: Yes   Status: Acute   


Plan to address problem: 


SCD to bilateral lower extremities while in bed, prophylactic heparin





--Full CODE STATUS


Current Visit: Yes   Status: Acute   


Plan to address problem: 








Plan of care reviewed with the patient and her nurse











History


Interval history: 


I have seen and examined the patient in isolation room at the bedside today


Isolation precautions, PPE protocols strictly observed


Patient PUI, high suspicion for Covid


Complains of generalized weakness


Patient is confused and obese


Vital signs noted, max last 24 hours 102.6 F


Mild distress








Hospitalist Physical





- Constitutional


Vitals: 


                                        











Temp Pulse Resp BP Pulse Ox


 


 97.9 F   82   13   141/73   96 


 


 11/12/20 07:34  11/12/20 07:34  11/12/20 07:34  11/12/20 07:34  11/12/20 07:34











General appearance: Present: mild distress, well-nourished, obese





- EENT


Eyes: Present: PERRL, EOM intact





- Neck


Neck: Present: supple, normal ROM





- Respiratory


Respiratory effort: normal


Respiratory: bilateral: diminished, rhonchi, negative: rales, wheezing





- Cardiovascular


Rhythm: regular


Heart Sounds: Present: S1 & S2





- Extremities


Extremities: no ischemia, No edema





- Abdominal


General gastrointestinal: soft, non-tender, non-distended, normal bowel sounds





- Integumentary


Integumentary: Present: clear, warm





- Psychiatric


Psychiatric: appropriate mood/affect, cooperative





- Neurologic


Neurologic: moves all extremities





HEART Score





- HEART Score


Troponin: 


                                        











Troponin T  0.048 ng/mL (0.00-0.029)  H  11/11/20  10:17    














Results





- Labs


CBC & Chem 7: 


                                 11/12/20 05:43





                                 11/12/20 05:43


Labs: 


                             Laboratory Last Values











WBC  9.4 K/mm3 (4.5-11.0)   11/12/20  05:43    


 


RBC  3.45 M/mm3 (3.65-5.03)  L  11/12/20  05:43    


 


Hgb  10.4 gm/dl (10.1-14.3)   11/12/20  05:43    


 


Hct  31.2 % (30.3-42.9)   11/12/20  05:43    


 


MCV  91 fl (79-97)   11/12/20  05:43    


 


MCH  30 pg (28-32)   11/12/20  05:43    


 


MCHC  33 % (30-34)   11/12/20  05:43    


 


RDW  14.2 % (13.2-15.2)   11/12/20  05:43    


 


Plt Count  260 K/mm3 (140-440)   11/12/20  05:43    


 


Lymph % (Auto)  10.0 % (13.4-35.0)  L  11/12/20  05:43    


 


Mono % (Auto)  1.7 % (0.0-7.3)   11/12/20  05:43    


 


Eos % (Auto)  0.0 % (0.0-4.3)   11/12/20  05:43    


 


Baso % (Auto)  0.3 % (0.0-1.8)   11/12/20  05:43    


 


Lymph # (Auto)  0.9 K/mm3 (1.2-5.4)  L  11/12/20  05:43    


 


Mono # (Auto)  0.2 K/mm3 (0.0-0.8)   11/12/20  05:43    


 


Eos # (Auto)  0.0 K/mm3 (0.0-0.4)   11/12/20  05:43    


 


Baso # (Auto)  0.0 K/mm3 (0.0-0.1)   11/12/20  05:43    


 


Seg Neutrophils %  88.0 % (40.0-70.0)  H  11/12/20  05:43    


 


Seg Neutrophils #  8.3 K/mm3 (1.8-7.7)  H  11/12/20  05:43    


 


D-Dimer  1485.80 ng/mlDDU (0-234)  H  11/11/20  12:41    


 


Sodium  139 mmol/L (137-145)   11/12/20  05:43    


 


Potassium  3.5 mmol/L (3.6-5.0)  L  11/12/20  05:43    


 


Chloride  98.5 mmol/L ()   11/12/20  05:43    


 


Carbon Dioxide  27 mmol/L (22-30)   11/12/20  05:43    


 


Anion Gap  17 mmol/L  11/12/20  05:43    


 


BUN  20 mg/dL (7-17)  H  11/12/20  05:43    


 


Creatinine  0.9 mg/dL (0.6-1.2)   11/12/20  05:43    


 


Estimated GFR  > 60 ml/min  11/12/20  05:43    


 


BUN/Creatinine Ratio  22 %  11/12/20  05:43    


 


Glucose  166 mg/dL ()  H  11/12/20  05:43    


 


POC Glucose  189 mg/dL ()  H  11/11/20  23:11    


 


Lactic Acid  1.40 mmol/L (0.7-2.0)   11/11/20  12:41    


 


Calcium  8.3 mg/dL (8.4-10.2)  L  11/12/20  05:43    


 


Ferritin  207.0 ng/mL (10.0-200.0)  H  11/11/20  10:17    


 


Total Bilirubin  1.00 mg/dL (0.1-1.2)   11/12/20  05:43    


 


AST  21 units/L (5-40)   11/12/20  05:43    


 


ALT  12 units/L (7-56)   11/12/20  05:43    


 


Alkaline Phosphatase  67 units/L ()   11/12/20  05:43    


 


Lactate Dehydrogenase  338 units/L ()  H  11/11/20  10:17    


 


Troponin T  0.048 ng/mL (0.00-0.029)  H  11/11/20  10:17    


 


C-Reactive Protein  8.00 mg/dL (0.00-1.30)  H  11/11/20  10:17    


 


NT-Pro-B Natriuret Pep  8422 pg/mL (0-900)  H  11/11/20  12:52    


 


Total Protein  6.1 g/dL (6.3-8.2)  L  11/12/20  05:43    


 


Albumin  3.5 g/dL (3.9-5)  L  11/12/20  05:43    


 


Albumin/Globulin Ratio  1.3 %  11/12/20  05:43    


 


Triglycerides  72 mg/dL (2-149)   11/11/20  10:17    


 


Cholesterol  120 mg/dL ()   11/11/20  10:17    


 


LDL Cholesterol Direct  62 mg/dL ()   11/11/20  10:17    


 


HDL Cholesterol  48 mg/dL (40-59)   11/11/20  10:17    


 


Cholesterol/HDL Ratio  2.50 %  11/11/20  10:17    


 


Procalcitonin  0.16 ng/mL (<0.15)   11/11/20  10:17    


 


Urine Color  Yellow  (Yellow)   11/11/20  11:30    


 


Urine Turbidity  Clear  (Clear)   11/11/20  11:30    


 


Urine pH  5.0  (5.0-7.0)   11/11/20  11:30    


 


Ur Specific Gravity  1.025  (1.003-1.030)   11/11/20  11:30    


 


Urine Protein  100 mg/dl mg/dL (Negative)   11/11/20  11:30    


 


Urine Glucose (UA)  Neg mg/dL (Negative)   11/11/20  11:30    


 


Urine Ketones  Neg mg/dL (Negative)   11/11/20  11:30    


 


Urine Blood  Neg  (Negative)   11/11/20  11:30    


 


Urine Nitrite  Neg  (Negative)   11/11/20  11:30    


 


Urine Bilirubin  Neg  (Negative)   11/11/20  11:30    


 


Urine Urobilinogen  2.0 mg/dL (<2.0)   11/11/20  11:30    


 


Ur Leukocyte Esterase  Tr  (Negative)   11/11/20  11:30    


 


Urine WBC (Auto)  1.0 /HPF (0.0-6.0)   11/11/20  11:30    


 


Urine RBC (Auto)  3.0 /HPF (0.0-6.0)   11/11/20  11:30    


 


Urine Bacteria (Auto)  1+ /HPF (Negative)   11/11/20  11:30    


 


Urine Mucus  Few /HPF  11/11/20  11:30    











Microbiology: 


Microbiology





11/11/20 10:17   Peripheral/Venous   Blood Culture - Preliminary


                            NO GROWTH AFTER 24 HOURS


11/11/20 10:17   Peripheral/Venous   Blood Culture - Preliminary


                            NO GROWTH AFTER 24 HOURS








Castillo/IV: 


                                        





IV Catheter Type [Left           INT / Saline Lock


Antecubital]                     











Active Medications





- Current Medications


Current Medications: 














Generic Name Dose Route Start Last Admin





  Trade Name Freq  PRN Reason Stop Dose Admin


 


Acetaminophen  650 mg  11/11/20 15:00 





  Tylenol  PO  





  Q4H PRN  





  Pain MILD(1-3)/Fever >100.5/HA  


 


Albuterol  2.5 mg  11/11/20 16:00 





  Proventil  IH  





  Q4HRT PRN  





  Shortness Of Breath  


 


Azithromycin  500 mg  11/13/20 10:00 





  Zithromax  PO  11/15/20 10:01 





  QDAY KYA  


 


Heparin Sodium (Porcine)  5,000 unit  11/11/20 22:00  11/12/20 13:10





  Heparin  SUB-Q   5,000 unit





  Q12HR KYA   Administration


 


Ceftriaxone Sodium  2 gm in 100 mls @ 200 mls/hr  11/12/20 16:00 





  Rocephin/Ns 2 Gm/100 Ml  IV  





  Q24H KYA  





  Protocol  


 


Azithromycin 500 mg/ Sodium  250 mls @ 250 mls/hr  11/11/20 16:00  11/11/20 

15:43





  Chloride  IV  11/12/20 23:00  250 mls/hr





  Q24H KYA   Administration





  Protocol  


 


Memantine  10 mg  11/11/20 23:00  11/12/20 01:00





  Memantine  PO   10 mg





  Q12HR KYA   Administration


 


Methylprednisolone Sodium Succinate  40 mg  11/11/20 14:00  11/12/20 13:10





  Solu-Medrol  IV   40 mg





  Q8HR KYA   Administration


 


Ondansetron HCl  4 mg  11/11/20 14:30 





  Zofran  IV  





  Q8H PRN  





  Nausea And Vomiting  


 


Quetiapine Fumarate  25 mg  11/12/20 22:00 





  Seroquel  PO  





  QHS KYA  


 


Sodium Chloride  10 ml  11/11/20 22:00  11/12/20 13:10





  Sodium Chloride Flush Syringe 10 Ml  IV   10 ml





  BID KYA   Administration


 


Sodium Chloride  10 ml  11/11/20 13:51 





  Sodium Chloride Flush Syringe 10 Ml  IV  





  PRN PRN  





  LINE FLUSH  


 


Trazodone HCl  50 mg  11/12/20 22:00 





  Desyrel  PO  





  QHS KYA

## 2020-11-13 LAB — CRP SERPL-MCNC: 5.5 MG/DL (ref 0–1.3)

## 2020-11-13 RX ADMIN — METHYLPREDNISOLONE SODIUM SUCCINATE SCH MG: 40 INJECTION, POWDER, FOR SOLUTION INTRAMUSCULAR; INTRAVENOUS at 05:50

## 2020-11-13 RX ADMIN — INSULIN LISPRO SCH UNIT: 100 INJECTION, SOLUTION INTRAVENOUS; SUBCUTANEOUS at 22:43

## 2020-11-13 RX ADMIN — AZITHROMYCIN SCH MG: 250 TABLET, FILM COATED ORAL at 15:28

## 2020-11-13 RX ADMIN — REMDESIVIR SCH MLS/HR: 100 INJECTION, POWDER, LYOPHILIZED, FOR SOLUTION INTRAVENOUS at 22:43

## 2020-11-13 RX ADMIN — MEMANTINE HYDROCHLORIDE SCH MG: 10 TABLET ORAL at 22:46

## 2020-11-13 RX ADMIN — SODIUM CHLORIDE SCH ML: 9 INJECTION, SOLUTION INTRAVENOUS at 21:10

## 2020-11-13 RX ADMIN — INSULIN LISPRO SCH UNIT: 100 INJECTION, SOLUTION INTRAVENOUS; SUBCUTANEOUS at 17:26

## 2020-11-13 RX ADMIN — MEMANTINE HYDROCHLORIDE SCH MG: 10 TABLET ORAL at 15:28

## 2020-11-13 RX ADMIN — CEFTRIAXONE SODIUM SCH MLS/HR: 2 INJECTION, POWDER, FOR SOLUTION INTRAMUSCULAR; INTRAVENOUS at 15:27

## 2020-11-13 RX ADMIN — METHYLPREDNISOLONE SODIUM SUCCINATE SCH MG: 40 INJECTION, POWDER, FOR SOLUTION INTRAMUSCULAR; INTRAVENOUS at 22:45

## 2020-11-13 RX ADMIN — METHYLPREDNISOLONE SODIUM SUCCINATE SCH MG: 40 INJECTION, POWDER, FOR SOLUTION INTRAMUSCULAR; INTRAVENOUS at 15:28

## 2020-11-13 RX ADMIN — HEPARIN SODIUM SCH UNIT: 5000 INJECTION, SOLUTION INTRAVENOUS; SUBCUTANEOUS at 22:45

## 2020-11-13 RX ADMIN — HEPARIN SODIUM SCH UNIT: 5000 INJECTION, SOLUTION INTRAVENOUS; SUBCUTANEOUS at 11:05

## 2020-11-13 RX ADMIN — Medication SCH ML: at 11:07

## 2020-11-13 NOTE — PROGRESS NOTE
Assessment and Plan


Assessment and plan: 


--COVID-19 test positive 7/12/2020





-- Acute respiratory failure with hypoxia


Current Visit: Yes   Status: Acute   


Plan to address problem: 


Secondary to multifocal pneumonia 


requiring high flow oxygen 35 L /50% FiO2


Titrate and wean as tolerated,Supportive care





--COVID-19 multifocal pneumonia


Current Visit: Yes   Status: Acute   


Plan to address problem: 


Empiric antibiotics with Rocephin and Zithromax





--Systemic inflammatory response syndrome


Current Visit: Yes   Status: Acute   


Plan to address problem: 


Follow cultures empiric antibiotics





--PUI suspected COVID-19 virus infection


Current Visit: Yes   Status: Acute   


Plan to address problem: 


Contact and droplet isolation


Continue Solu-Medrol, remdesivir


Home oxygen evaluation when patient is stable


Follow-up inflammatory markers





--DVT prophylaxis


Current Visit: Yes   Status: Acute   


Plan to address problem: 


SCD /SQ heparin





--Full CODE STATUS


Current Visit: Yes   Status: Acute   


Plan to address problem: 


Plan of care reviewed with the patient and her nurse


Follow ID evaluation and recommendations.


Patient is critically ill with very poor prognosis








Brief history;


80-year-old female patient with multiple medical problems was admitted through 

emergency room with altered level of consciousness at severe weakness


Fever and hypoxemia.  Patient was PUI placed in isolation tested for corona PCR,

positive for COVID-19 on 11/12/2020, consulted ID, started treatment per COVID-

19 protocols.  Currently patient is requiring high flow oxygen, ID following, on

Solu-Medrol and remdesivir, following inflammatory markers per protocol.


Very poor prognosis.  Follow ID recommendations




















History


Interval history: 


I have seen and examined the patient at the bedside today


Isolation precautions and PPE protocols strictly followed


Patient is critically ill short of breath


Afebrile, vital signs reviewed








Hospitalist Physical





- Constitutional


Vitals: 


                                        











Temp Pulse Resp BP Pulse Ox


 


 97.3 F L  92 H  20   170/84   91 


 


 11/13/20 06:07  11/13/20 06:07  11/13/20 06:07  11/13/20 06:07  11/13/20 06:07











General appearance: Present: mild distress, well-nourished, obese





- EENT


Eyes: Present: PERRL, EOM intact





- Neck


Neck: Present: supple, normal ROM





- Respiratory


Respiratory effort: normal


Respiratory: bilateral: diminished, rhonchi, negative: rales, wheezing





- Cardiovascular


Rhythm: regular


Heart Sounds: Present: S1 & S2





- Extremities


Extremities: no ischemia, No edema





- Abdominal


General gastrointestinal: soft, non-tender, non-distended, normal bowel sounds





- Integumentary


Integumentary: Present: clear, warm





- Psychiatric


Psychiatric: appropriate mood/affect, cooperative, other (Confused at times)





- Neurologic


Neurologic: moves all extremities





HEART Score





- HEART Score


Troponin: 


                                        











Troponin T  0.048 ng/mL (0.00-0.029)  H  11/11/20  10:17    














Results





- Labs


CBC & Chem 7: 


                                 11/12/20 05:43





                                 11/12/20 05:43


Labs: 


                             Laboratory Last Values











WBC  9.4 K/mm3 (4.5-11.0)   11/12/20  05:43    


 


RBC  3.45 M/mm3 (3.65-5.03)  L  11/12/20  05:43    


 


Hgb  10.4 gm/dl (10.1-14.3)   11/12/20  05:43    


 


Hct  31.2 % (30.3-42.9)   11/12/20  05:43    


 


MCV  91 fl (79-97)   11/12/20  05:43    


 


MCH  30 pg (28-32)   11/12/20  05:43    


 


MCHC  33 % (30-34)   11/12/20  05:43    


 


RDW  14.2 % (13.2-15.2)   11/12/20  05:43    


 


Plt Count  260 K/mm3 (140-440)   11/12/20  05:43    


 


Lymph % (Auto)  10.0 % (13.4-35.0)  L  11/12/20  05:43    


 


Mono % (Auto)  1.7 % (0.0-7.3)   11/12/20  05:43    


 


Eos % (Auto)  0.0 % (0.0-4.3)   11/12/20  05:43    


 


Baso % (Auto)  0.3 % (0.0-1.8)   11/12/20  05:43    


 


Lymph # (Auto)  0.9 K/mm3 (1.2-5.4)  L  11/12/20  05:43    


 


Mono # (Auto)  0.2 K/mm3 (0.0-0.8)   11/12/20  05:43    


 


Eos # (Auto)  0.0 K/mm3 (0.0-0.4)   11/12/20  05:43    


 


Baso # (Auto)  0.0 K/mm3 (0.0-0.1)   11/12/20  05:43    


 


Seg Neutrophils %  88.0 % (40.0-70.0)  H  11/12/20  05:43    


 


Seg Neutrophils #  8.3 K/mm3 (1.8-7.7)  H  11/12/20  05:43    


 


D-Dimer  1531.59 ng/mlDDU (0-234)  H  11/13/20  08:05    


 


Sodium  139 mmol/L (137-145)   11/12/20  05:43    


 


Potassium  3.5 mmol/L (3.6-5.0)  L  11/12/20  05:43    


 


Chloride  98.5 mmol/L ()   11/12/20  05:43    


 


Carbon Dioxide  27 mmol/L (22-30)   11/12/20  05:43    


 


Anion Gap  17 mmol/L  11/12/20  05:43    


 


BUN  20 mg/dL (7-17)  H  11/12/20  05:43    


 


Creatinine  0.9 mg/dL (0.6-1.2)   11/12/20  05:43    


 


Estimated GFR  > 60 ml/min  11/12/20  05:43    


 


BUN/Creatinine Ratio  22 %  11/12/20  05:43    


 


Glucose  166 mg/dL ()  H  11/12/20  05:43    


 


POC Glucose  189 mg/dL ()  H  11/11/20  23:11    


 


Lactic Acid  1.40 mmol/L (0.7-2.0)   11/11/20  12:41    


 


Calcium  8.3 mg/dL (8.4-10.2)  L  11/12/20  05:43    


 


Ferritin  282.4 ng/mL (10.0-200.0)  H  11/13/20  08:05    


 


Total Bilirubin  1.00 mg/dL (0.1-1.2)   11/12/20  05:43    


 


AST  21 units/L (5-40)   11/12/20  05:43    


 


ALT  12 units/L (7-56)   11/12/20  05:43    


 


Alkaline Phosphatase  67 units/L ()   11/12/20  05:43    


 


Lactate Dehydrogenase  456 units/L ()  H  11/13/20  08:05    


 


Troponin T  0.048 ng/mL (0.00-0.029)  H  11/11/20  10:17    


 


C-Reactive Protein  5.50 mg/dL (0.00-1.30)  H  11/13/20  08:05    


 


NT-Pro-B Natriuret Pep  8422 pg/mL (0-900)  H  11/11/20  12:52    


 


Total Protein  6.1 g/dL (6.3-8.2)  L  11/12/20  05:43    


 


Albumin  3.5 g/dL (3.9-5)  L  11/12/20  05:43    


 


Albumin/Globulin Ratio  1.3 %  11/12/20  05:43    


 


Triglycerides  72 mg/dL (2-149)   11/11/20  10:17    


 


Cholesterol  120 mg/dL ()   11/11/20  10:17    


 


LDL Cholesterol Direct  62 mg/dL ()   11/11/20  10:17    


 


HDL Cholesterol  48 mg/dL (40-59)   11/11/20  10:17    


 


Cholesterol/HDL Ratio  2.50 %  11/11/20  10:17    


 


Procalcitonin  0.16 ng/mL (<0.15)   11/11/20  10:17    


 


Urine Color  Yellow  (Yellow)   11/11/20  11:30    


 


Urine Turbidity  Clear  (Clear)   11/11/20  11:30    


 


Urine pH  5.0  (5.0-7.0)   11/11/20  11:30    


 


Ur Specific Gravity  1.025  (1.003-1.030)   11/11/20  11:30    


 


Urine Protein  100 mg/dl mg/dL (Negative)   11/11/20  11:30    


 


Urine Glucose (UA)  Neg mg/dL (Negative)   11/11/20  11:30    


 


Urine Ketones  Neg mg/dL (Negative)   11/11/20  11:30    


 


Urine Blood  Neg  (Negative)   11/11/20  11:30    


 


Urine Nitrite  Neg  (Negative)   11/11/20  11:30    


 


Urine Bilirubin  Neg  (Negative)   11/11/20  11:30    


 


Urine Urobilinogen  2.0 mg/dL (<2.0)   11/11/20  11:30    


 


Ur Leukocyte Esterase  Tr  (Negative)   11/11/20  11:30    


 


Urine WBC (Auto)  1.0 /HPF (0.0-6.0)   11/11/20  11:30    


 


Urine RBC (Auto)  3.0 /HPF (0.0-6.0)   11/11/20  11:30    


 


Urine Bacteria (Auto)  1+ /HPF (Negative)   11/11/20  11:30    


 


Urine Mucus  Few /HPF  11/11/20  11:30    


 


Coronavirus (PCR)  Positive  (Negative)  A  11/12/20  Unknown











Microbiology: 


Microbiology





11/11/20 10:17   Peripheral/Venous   Blood Culture - Preliminary


                            NO GROWTH AFTER 24 HOURS


11/11/20 10:17   Peripheral/Venous   Blood Culture - Preliminary


                            NO GROWTH AFTER 24 HOURS








Castillo/IV: 


                                        





Voiding Method                   Incontinent


IV Catheter Type [Right          INT / Saline Lock


Forearm]                         


IV Catheter Type [Left           INT / Saline Lock


Antecubital]                     











Active Medications





- Current Medications


Current Medications: 














Generic Name Dose Route Start Last Admin





  Trade Name Freq  PRN Reason Stop Dose Admin


 


Acetaminophen  650 mg  11/11/20 15:00 





  Tylenol  PO  





  Q4H PRN  





  Pain MILD(1-3)/Fever >100.5/HA  


 


Albuterol  2.5 mg  11/11/20 16:00 





  Proventil  IH  





  Q4HRT PRN  





  Shortness Of Breath  


 


Azithromycin  500 mg  11/13/20 10:00 





  Zithromax  PO  11/15/20 10:01 





  QDAY KYA  


 


Heparin Sodium (Porcine)  5,000 unit  11/11/20 22:00  11/12/20 21:35





  Heparin  SUB-Q   5,000 unit





  Q12HR KYA   Administration


 


Ceftriaxone Sodium  2 gm in 100 mls @ 200 mls/hr  11/12/20 16:00  11/12/20 17:05





  Rocephin/Ns 2 Gm/100 Ml  IV   200 mls/hr





  Q24H KYA   Administration





  Protocol  


 


REMDESIVIR 100 mg/ Sodium  250 mls @ 500 mls/hr  11/13/20 21:00 





  Chloride  IV  11/16/20 21:29 





  Q24HR@2100 KYA  


 


Memantine  10 mg  11/11/20 23:00  11/12/20 21:33





  Memantine  PO   10 mg





  Q12HR KYA   Administration


 


Methylprednisolone Sodium Succinate  40 mg  11/11/20 14:00  11/13/20 05:50





  Solu-Medrol  IV   40 mg





  Q8HR KYA   Administration


 


Ondansetron HCl  4 mg  11/11/20 14:30 





  Zofran  IV  





  Q8H PRN  





  Nausea And Vomiting  


 


Quetiapine Fumarate  25 mg  11/12/20 22:00  11/12/20 21:55





  Seroquel  PO   25 mg





  QHS KYA   Administration


 


Sodium Chloride  10 ml  11/11/20 22:00  11/12/20 21:33





  Sodium Chloride Flush Syringe 10 Ml  IV   10 ml





  BID KYA   Administration


 


Sodium Chloride  10 ml  11/11/20 13:51 





  Sodium Chloride Flush Syringe 10 Ml  IV  





  PRN PRN  





  LINE FLUSH  


 


Sodium Chloride  50 ml  11/12/20 21:00  11/12/20 21:35





  Nacl 0.9%  IV  11/16/20 21:31  50 ml





  2130 KYA   Administration


 


Trazodone HCl  50 mg  11/12/20 22:00  11/12/20 21:33





  Desyrel  PO   50 mg





  QHS KYA   Administration

## 2020-11-13 NOTE — CONSULTATION
History of Present Illness





- Reason for Consult


Consult date: 11/13/20


COVID-19


Requesting physician: AMY J KOCHERLA





- History of Present Illness


80 years old female with history of hyperlipidemia, hypertension, dementia, 

breast cancer, CVA, admitted on secondary to altered mental status confusion for

unknown duration.  Patient is not the best historian, daughter gave history of 

generalized weakness and confusion for over a month.  However symptoms worsened 

last week.  Patient has remained bedbound.  Her gait is on a stable.  Pulse 

oximetry at home was found to be in the 80s on admission.  On arrival, 

temperature 102.6, O2 sat dropped to 82%, initial WBC 13.1.  Lactate 2.2.  

Ferritin 207.  D-dimer 1485.  Urinalysis negative.  Chest x-ray with 

cardiomegaly and bilateral infiltrates.  CTA shows bilateral pulmonary edema and

small bilateral pleural effusions.








Review of Systems: reviewed ED and H&P notes. Limited due to PPE conservation 

strategy  

















Past History


Past Medical History: cancer, hypertension, hyperlipidemia, stroke


Past Surgical History: Other (Breast surgery)


Social history: .  denies: smoking, alcohol abuse, prescription drug abus

e


Family history: hypertension





Medications and Allergies


                                    Allergies











Allergy/AdvReac Type Severity Reaction Status Date / Time


 


No Known Allergies Allergy   Verified 11/11/20 09:25











                                Home Medications











 Medication  Instructions  Recorded  Confirmed  Last Taken  Type


 


Amlodipine Besylate [Norvasc] 10 mg PO DAILY 11/11/20 11/11/20 Unknown History


 


Anastrozole 1 mg PO DAILY 11/11/20 11/11/20 Unknown History


 


AtorvaSTATin [Lipitor] 20 mg PO DAILY 11/11/20 11/11/20 Unknown History


 


Clopidogrel [Plavix] 75 mg PO DAILY 11/11/20 11/11/20 Unknown History


 


Fluticasone/Vilanterol [Breo 1 spray IH Q4HR 11/11/20 11/11/20 Unknown History





Ellipta 100-25 Mcg INH]     


 


Losartan Potassium 25 mg PO DAILY 11/11/20 11/11/20 Unknown History


 


Memantine HCl 10 mg PO BID 11/11/20 11/11/20 Unknown History


 


Metformin HCl [Glucophage Xr] 500 mg PO BID 11/11/20 11/11/20 Unknown History


 


Quetiapine Fumarate [SEROquel XR] 25 mg PO QDAY 11/11/20 11/11/20 Unknown 

History


 


Tramadol HCl 50 mg PO Q4H PRN 11/11/20 11/11/20 Unknown History











Active Meds: 


Active Medications





Acetaminophen (Tylenol)  650 mg PO Q4H PRN


   PRN Reason: Pain MILD(1-3)/Fever >100.5/HA


Albuterol (Proventil)  2.5 mg IH Q4HRT PRN


   PRN Reason: Shortness Of Breath


Azithromycin (Zithromax)  500 mg PO QDAY [facility]


   Stop: 11/15/20 10:01


Heparin Sodium (Porcine) (Heparin)  5,000 unit SUB-Q Q12HR [facility]


   Last Admin: 11/13/20 11:05 Dose:  5,000 unit


   Documented by: 


Ceftriaxone Sodium (Rocephin/Ns 2 Gm/100 Ml)  2 gm in 100 mls @ 200 mls/hr IV 

Q24H [facility]; Protocol


   Last Admin: 11/12/20 17:05 Dose:  200 mls/hr


   Documented by: 


REMDESIVIR 100 mg/ Sodium (Chloride)  250 mls @ 500 mls/hr IV Q24HR@2100 [facility]


   Stop: 11/16/20 21:29


Memantine (Memantine)  10 mg PO Q12HR [facility]


   Last Admin: 11/12/20 21:33 Dose:  10 mg


   Documented by: 


Methylprednisolone Sodium Succinate (Solu-Medrol)  40 mg IV Q8HR [facility]


   Last Admin: 11/13/20 05:50 Dose:  40 mg


   Documented by: 


Ondansetron HCl (Zofran)  4 mg IV Q8H PRN


   PRN Reason: Nausea And Vomiting


Quetiapine Fumarate (Seroquel)  25 mg PO QHS [facility]


   Last Admin: 11/12/20 21:55 Dose:  25 mg


   Documented by: 


Sodium Chloride (Sodium Chloride Flush Syringe 10 Ml)  10 ml IV BID [facility]


   Last Admin: 11/13/20 11:07 Dose:  10 ml


   Documented by: 


Sodium Chloride (Sodium Chloride Flush Syringe 10 Ml)  10 ml IV PRN PRN


   PRN Reason: LINE FLUSH


Sodium Chloride (Nacl 0.9%)  50 ml IV 2130 [facility]


   Stop: 11/16/20 21:31


   Last Admin: 11/12/20 21:35 Dose:  50 ml


   Documented by: 


Trazodone HCl (Desyrel)  50 mg PO QHS [facility]


   Last Admin: 11/12/20 21:33 Dose:  50 mg


   Documented by: 











Physical Examination





- Physical Exam


Narrative exam: 





 


Physical Exam: reviewed ED and hospitalist notes, limited due to conservation of

PPE and decrease risk of transmission.


General appearance:  limited due to conservation of PPE


Eyes: limited due to conservation of PPE


HENT: Atraumatic;  limited due to conservation of PPE


Lungs:  limited due to conservation of PPE


CV:  limited due to conservation of PPE


Abdomen: limited due to conservation of PPE


Extremities:  limited due to conservation of PPE


Skin:  limited due to conservation of PPE


Psych: limited due to conservation of PPE


Neuro:  limited due to conservation of PPE











- Constitutional


Vitals: 


                                   Vital Signs











Temp Pulse Resp BP Pulse Ox


 


 97.3 F L  92 H  20   170/84   91 


 


 11/13/20 06:07  11/13/20 06:07  11/13/20 06:07  11/13/20 06:07  11/13/20 06:07








                           Temperature -Last 24 Hours











Temperature                    97.3 F


 


Temperature                    98.0 F


 


Temperature                    98.4 F

















Results





- Labs


CBC & Chem 7: 


                                 11/12/20 05:43





                                 11/12/20 05:43


Labs: 


                              Abnormal lab results











  11/12/20 11/13/20 11/13/20 Range/Units





  Unknown 08:05 08:05 


 


D-Dimer   1531.59 H   (0-234)  ng/mlDDU


 


Ferritin    282.4 H  (10.0-200.0)  ng/mL


 


Lactate Dehydrogenase     ()  units/L


 


C-Reactive Protein     (0.00-1.30)  mg/dL


 


Coronavirus (PCR)  Positive A    (Negative)  














  11/13/20 Range/Units





  08:05 


 


D-Dimer   (0-234)  ng/mlDDU


 


Ferritin   (10.0-200.0)  ng/mL


 


Lactate Dehydrogenase  456 H  ()  units/L


 


C-Reactive Protein  5.50 H  (0.00-1.30)  mg/dL


 


Coronavirus (PCR)   (Negative)  














Assessment and Plan





 


Cultures:


Blood culture no growth today


SARS CoV2 PCR positive


 


Assessment: 80 years old female with history of hyperlipidemia, hypertension, 

dementia, breast cancer, CVA, admitted on secondary to altered mental status 

confusion for unknown duration:





#Severe sepsis: Present on admission with fever, tachycardia, hypoxia, elevated 

lactate likely due to bilateral pneumonia. 





#Severe COVID pneumonia: Elevated D-dimer.  CTA shows bilateral infiltrates.





#Acute hypoxemic respiratory failure: Secondary to COVID-19 +/-volume overload. 

Initial O2 sat of 22%.  Patient currently on high flow nasal cannula 50%.





Recommendations:


-Continue dexamethasone 6 mg IV/PO daily for 10 days


-Continue remdesivir 5 days


-Monitor inflammatory markers - ferritin, Ddimer, CRP, LDH


-Stop ceftriaxone and azithromycin, procalcitonin <0.25 ng/mL  


-Monitor liver function test on Remdesivir


-Continue anticoagulation per System Protocol 


-Prone positioning as possible 


-Obtain SARS CoV-2 IgG to determine if patient is a candidate for COVID 

convalescent plasma





All laboratory, cultures and imaging were reviewed. Discussed with attending.





High risk mortality


 


Will follow





Nova Johnson MD


Infectious Diseases Consultant 


[facility] ([facility]


M [phone]


O [phone]

## 2020-11-14 RX ADMIN — Medication SCH ML: at 22:51

## 2020-11-14 RX ADMIN — INSULIN LISPRO SCH: 100 INJECTION, SOLUTION INTRAVENOUS; SUBCUTANEOUS at 22:00

## 2020-11-14 RX ADMIN — INSULIN LISPRO SCH UNIT: 100 INJECTION, SOLUTION INTRAVENOUS; SUBCUTANEOUS at 11:30

## 2020-11-14 RX ADMIN — INSULIN LISPRO SCH UNIT: 100 INJECTION, SOLUTION INTRAVENOUS; SUBCUTANEOUS at 07:30

## 2020-11-14 RX ADMIN — MEMANTINE HYDROCHLORIDE SCH MG: 10 TABLET ORAL at 11:09

## 2020-11-14 RX ADMIN — LOSARTAN POTASSIUM SCH MG: 25 TABLET, FILM COATED ORAL at 11:08

## 2020-11-14 RX ADMIN — HEPARIN SODIUM SCH UNIT: 5000 INJECTION, SOLUTION INTRAVENOUS; SUBCUTANEOUS at 22:48

## 2020-11-14 RX ADMIN — METHYLPREDNISOLONE SODIUM SUCCINATE SCH MG: 40 INJECTION, POWDER, FOR SOLUTION INTRAMUSCULAR; INTRAVENOUS at 13:03

## 2020-11-14 RX ADMIN — HEPARIN SODIUM SCH UNIT: 5000 INJECTION, SOLUTION INTRAVENOUS; SUBCUTANEOUS at 11:08

## 2020-11-14 RX ADMIN — METHYLPREDNISOLONE SODIUM SUCCINATE SCH MG: 40 INJECTION, POWDER, FOR SOLUTION INTRAMUSCULAR; INTRAVENOUS at 05:54

## 2020-11-14 RX ADMIN — REMDESIVIR SCH MLS/HR: 100 INJECTION, POWDER, LYOPHILIZED, FOR SOLUTION INTRAVENOUS at 22:50

## 2020-11-14 RX ADMIN — Medication SCH ML: at 11:11

## 2020-11-14 RX ADMIN — MEMANTINE HYDROCHLORIDE SCH MG: 10 TABLET ORAL at 22:49

## 2020-11-14 RX ADMIN — SODIUM CHLORIDE SCH ML: 9 INJECTION, SOLUTION INTRAVENOUS at 22:50

## 2020-11-14 RX ADMIN — Medication SCH ML: at 13:03

## 2020-11-14 RX ADMIN — METHYLPREDNISOLONE SODIUM SUCCINATE SCH MG: 40 INJECTION, POWDER, FOR SOLUTION INTRAMUSCULAR; INTRAVENOUS at 22:49

## 2020-11-14 RX ADMIN — AZITHROMYCIN SCH MG: 250 TABLET, FILM COATED ORAL at 11:08

## 2020-11-14 RX ADMIN — CLOPIDOGREL BISULFATE SCH MG: 75 TABLET ORAL at 11:09

## 2020-11-14 RX ADMIN — INSULIN LISPRO SCH UNIT: 100 INJECTION, SOLUTION INTRAVENOUS; SUBCUTANEOUS at 16:30

## 2020-11-14 NOTE — PROGRESS NOTE
Assessment and Plan





Assessment and Plan


--COVID-19 test positive 11/12/2020





-- Acute respiratory failure with hypoxia


Current Visit: Yes   Status: Acute   


Plan to address problem: 


Secondary to multifocal pneumonia 


requiring high flow oxygen 35 L /50% FiO2


Titrate and wean as tolerated,Supportive care





--COVID-19 multifocal pneumonia


Current Visit: Yes   Status: Acute   


Plan to address problem: 


Empiric antibiotics with Rocephin and Zithromax





--Sepsis


Current Visit: Yes   Status: Acute   


Plan to address problem:


 Severe sepsis: Present on admission with fever, tachycardia, hypoxia, elevated 

lactate likely due to bilateral pneumonia. 








--PUI suspected COVID-19 virus infection


Current Visit: Yes   Status: Acute   


Plan to address problem: 


Contact and droplet isolation


Continue Solu-Medrol, remdesivir


Home oxygen evaluation when patient is stable


Follow-up inflammatory markers





--DVT prophylaxis


Current Visit: Yes   Status: Acute   


Plan to address problem: 


SCD /SQ heparin





--Full CODE STATUS


Current Visit: Yes   Status: Acute   


Plan to address problem: 


Plan of care reviewed with the patient and her nurse


Follow ID evaluation and recommendations.


Patient is critically ill with very poor prognosis








Subjective


Date of service: 11/14/20


Principal diagnosis: Respiratory failure with hypoxia, bilateral pneumonia


Interval history: 











Brief history;


80-year-old female patient with multiple medical problems was admitted through 

emergency room with altered level of consciousness at severe weakness


Fever and hypoxemia.  Patient was PUI placed in isolation tested for corona PCR,

positive for COVID-19 on 11/12/2020, consulted ID, started treatment per COVID-

19 protocols.  Currently patient is requiring high flow oxygen, ID following, on

Solu-Medrol and remdesivir, following inflammatory markers per protocol.


Very poor prognosis.  Follow ID recommendations





I have seen and examined the patient at the bedside today


Isolation precautions and PPE protocols strictly followed


Patient is critically ill short of breath


Afebrile, vital signs reviewed





Day #4 11/14/2020


Patient still on high flow oxygen























Objective





- Constitutional


Vitals: 


                               Vital Signs - 12hr











  11/14/20 11/14/20 11/14/20





  00:20 02:00 09:14


 


Pulse Rate   


 


Respiratory  20 





Rate   


 


Blood Pressure   


 


O2 Sat by Pulse 96  95





Oximetry   














  11/14/20





  11:08


 


Pulse Rate 92 H


 


Respiratory 





Rate 


 


Blood Pressure 150/76


 


O2 Sat by Pulse 





Oximetry 











General appearance: Present: mild distress, well-nourished





- EENT


Eyes: PERRL, EOM intact


ENT: hearing intact, clear oral mucosa


Ears: bilateral: normal





- Neck


Neck: supple, normal ROM





- Respiratory


Respiratory effort: normal


Respiratory: bilateral: CTA, rhonchi (Scattered)





- Breasts


Breasts: normal





- Cardiovascular


Heart rate: 78


Rhythm: regular


Heart Sounds: Present: S1 & S2.  Absent: gallop, rub


Extremities: no ischemia, pulses intact, No edema, normal color, Full ROM





- Gastrointestinal


General gastrointestinal: Present: soft, non-tender, non-distended, normal bowel

sounds


Rectal Exam: deferred





- Genitourinary


Female genitourinary: normal





- Integumentary


Integumentary: clear, warm, dry





- Musculoskeletal


Musculoskeletal: 1, strength equal bilaterally





- Neurologic


Neurologic: moves all extremities





- Psychiatric


Psychiatric: memory intact, appropriate mood/affect, intact judgment & insight





- Allied health notes


Allied health notes reviewed: nursing, case management





- Labs


CBC & Chem 7: 


                                 11/12/20 05:43





                                 11/12/20 05:43


Labs: 


                              Abnormal lab results











  11/13/20 11/13/20 11/13/20 Range/Units





  13:27 17:06 22:57 


 


POC Glucose  205 H  214 H  169 H  ()  mg/dL














  11/14/20 Range/Units





  08:36 


 


POC Glucose  207 H  ()  mg/dL














HEART Score





- HEART Score


Troponin: 


                                        











Troponin T  0.048 ng/mL (0.00-0.029)  H  11/11/20  10:17

## 2020-11-15 RX ADMIN — REMDESIVIR SCH MLS/HR: 100 INJECTION, POWDER, LYOPHILIZED, FOR SOLUTION INTRAVENOUS at 21:57

## 2020-11-15 RX ADMIN — Medication SCH ML: at 21:59

## 2020-11-15 RX ADMIN — INSULIN LISPRO SCH UNIT: 100 INJECTION, SOLUTION INTRAVENOUS; SUBCUTANEOUS at 16:30

## 2020-11-15 RX ADMIN — LOSARTAN POTASSIUM SCH MG: 25 TABLET, FILM COATED ORAL at 10:01

## 2020-11-15 RX ADMIN — SODIUM CHLORIDE SCH ML: 9 INJECTION, SOLUTION INTRAVENOUS at 21:57

## 2020-11-15 RX ADMIN — HEPARIN SODIUM SCH UNIT: 5000 INJECTION, SOLUTION INTRAVENOUS; SUBCUTANEOUS at 21:58

## 2020-11-15 RX ADMIN — MEMANTINE HYDROCHLORIDE SCH MG: 10 TABLET ORAL at 21:58

## 2020-11-15 RX ADMIN — METHYLPREDNISOLONE SODIUM SUCCINATE SCH MG: 40 INJECTION, POWDER, FOR SOLUTION INTRAMUSCULAR; INTRAVENOUS at 05:44

## 2020-11-15 RX ADMIN — INSULIN LISPRO SCH UNIT: 100 INJECTION, SOLUTION INTRAVENOUS; SUBCUTANEOUS at 23:44

## 2020-11-15 RX ADMIN — MEMANTINE HYDROCHLORIDE SCH MG: 10 TABLET ORAL at 10:01

## 2020-11-15 RX ADMIN — METHYLPREDNISOLONE SODIUM SUCCINATE SCH MG: 40 INJECTION, POWDER, FOR SOLUTION INTRAMUSCULAR; INTRAVENOUS at 21:58

## 2020-11-15 RX ADMIN — HEPARIN SODIUM SCH UNIT: 5000 INJECTION, SOLUTION INTRAVENOUS; SUBCUTANEOUS at 10:01

## 2020-11-15 RX ADMIN — INSULIN LISPRO SCH: 100 INJECTION, SOLUTION INTRAVENOUS; SUBCUTANEOUS at 11:30

## 2020-11-15 RX ADMIN — INSULIN LISPRO SCH UNIT: 100 INJECTION, SOLUTION INTRAVENOUS; SUBCUTANEOUS at 07:30

## 2020-11-15 RX ADMIN — Medication SCH ML: at 10:02

## 2020-11-15 RX ADMIN — CLOPIDOGREL BISULFATE SCH MG: 75 TABLET ORAL at 10:01

## 2020-11-15 RX ADMIN — METHYLPREDNISOLONE SODIUM SUCCINATE SCH MG: 40 INJECTION, POWDER, FOR SOLUTION INTRAMUSCULAR; INTRAVENOUS at 13:34

## 2020-11-15 NOTE — PROGRESS NOTE
Assessment and Plan





Assessment and Plan


--COVID-19 test positive 11/12/2020





-- Acute respiratory failure with hypoxia


Current Visit: Yes   Status: Acute   


Plan to address problem: 


Secondary to multifocal pneumonia 


requiring high flow oxygen 35 L /50% FiO2 telemetry yesterday


Titrate and wean as tolerated,Supportive care


Today patient is on 3 L nasal cannula oxygen





--COVID-19 multifocal pneumonia


Current Visit: Yes   Status: Acute   


Plan to address problem: 


Empiric antibiotics with Rocephin and Zithromax





--Sepsis


Current Visit: Yes   Status: Acute   


Plan to address problem:


 Severe sepsis: Present on admission with fever, tachycardia, hypoxia, elevated 

lactate likely due to bilateral pneumonia. 








--PUI suspected COVID-19 virus infection


Current Visit: Yes   Status: Acute   


Plan to address problem: 


Contact and droplet isolation


Continue Solu-Medrol, remdesivir


Home oxygen evaluation when patient is stable


Follow-up inflammatory markers





--DVT prophylaxis


Current Visit: Yes   Status: Acute   


Plan to address problem: 


SCD /SQ heparin





--Full CODE STATUS


Current Visit: Yes   Status: Acute   


Plan to address problem: 


Plan of care reviewed with the patient and her nurse


Follow ID evaluation and recommendations.


Patient is critically ill with very poor prognosis





Weaning of oxygen in progress








Subjective


Date of service: 11/15/20


Principal diagnosis: Respiratory failure with hypoxia, bilateral pneumonia


Interval history: 











Brief history;


80-year-old female patient with multiple medical problems was admitted through 

emergency room with altered level of consciousness at severe weakness


Fever and hypoxemia.  Patient was PUI placed in isolation tested for corona PCR,

positive for COVID-19 on 11/12/2020, consulted ID, started treatment per COVID-

19 protocols.  Currently patient is requiring high flow oxygen, ID following, on

Solu-Medrol and remdesivir, following inflammatory markers per protocol.


Very poor prognosis.  Follow ID recommendations





I have seen and examined the patient at the bedside today


Isolation precautions and PPE protocols strictly followed


Patient is critically ill short of breath


Afebrile, vital signs reviewed





Day #4 11/14/2020


Patient still on high flow oxygen





Day #5 11/15/2020


Patient on 3 L nasal cannula oxygen which is an improvement from yesterday























Objective





- Constitutional


Vitals: 


                               Vital Signs - 12hr











  11/15/20 11/15/20 11/15/20





  04:57 05:21 09:16


 


Temperature  98.6 F 


 


Pulse Rate   


 


Respiratory  20 





Rate   


 


Blood Pressure  188/83 


 


O2 Sat by Pulse 92  97





Oximetry   














  11/15/20 11/15/20





  10:01 14:45


 


Temperature  


 


Pulse Rate 89 


 


Respiratory  





Rate  


 


Blood Pressure 170/70 


 


O2 Sat by Pulse  97





Oximetry  











General appearance: Present: mild distress, well-nourished





- EENT


Eyes: PERRL, EOM intact


ENT: hearing intact, clear oral mucosa


Ears: bilateral: normal





- Neck


Neck: supple, normal ROM





- Respiratory


Respiratory effort: normal


Respiratory: bilateral: CTA





- Breasts


Breasts: normal





- Cardiovascular


Heart rate: 78


Rhythm: regular


Heart Sounds: Present: S1 & S2.  Absent: gallop, rub


Extremities: no ischemia, pulses intact, No edema, normal color, Full ROM





- Gastrointestinal


General gastrointestinal: Present: soft, non-tender, non-distended, normal bowel

sounds





- Genitourinary


Female genitourinary: normal





- Integumentary


Integumentary: clear, warm, dry





- Musculoskeletal


Musculoskeletal: 1, strength equal bilaterally





- Neurologic


Neurologic: moves all extremities





- Psychiatric


Psychiatric: memory intact, appropriate mood/affect, intact judgment & insight





- Allied health notes


Allied health notes reviewed: nursing, case management





- Labs


CBC & Chem 7: 


                                 11/12/20 05:43





                                 11/12/20 05:43


Labs: 


                              Abnormal lab results











  11/14/20 11/14/20 11/15/20 Range/Units





  17:22 22:56 09:45 


 


POC Glucose  154 H  166 H  251 H  ()  mg/dL














  11/15/20 Range/Units





  13:27 


 


POC Glucose  114 H  ()  mg/dL














HEART Score





- HEART Score


Troponin: 


                                        











Troponin T  0.048 ng/mL (0.00-0.029)  H  11/11/20  10:17

## 2020-11-16 RX ADMIN — METHYLPREDNISOLONE SODIUM SUCCINATE SCH MG: 40 INJECTION, POWDER, FOR SOLUTION INTRAMUSCULAR; INTRAVENOUS at 13:19

## 2020-11-16 RX ADMIN — INSULIN LISPRO SCH UNIT: 100 INJECTION, SOLUTION INTRAVENOUS; SUBCUTANEOUS at 17:28

## 2020-11-16 RX ADMIN — MEMANTINE HYDROCHLORIDE SCH MG: 10 TABLET ORAL at 22:01

## 2020-11-16 RX ADMIN — MEMANTINE HYDROCHLORIDE SCH MG: 10 TABLET ORAL at 09:41

## 2020-11-16 RX ADMIN — HEPARIN SODIUM SCH UNIT: 5000 INJECTION, SOLUTION INTRAVENOUS; SUBCUTANEOUS at 22:00

## 2020-11-16 RX ADMIN — HEPARIN SODIUM SCH UNIT: 5000 INJECTION, SOLUTION INTRAVENOUS; SUBCUTANEOUS at 09:42

## 2020-11-16 RX ADMIN — LOSARTAN POTASSIUM SCH MG: 25 TABLET, FILM COATED ORAL at 09:41

## 2020-11-16 RX ADMIN — REMDESIVIR SCH MLS/HR: 100 INJECTION, POWDER, LYOPHILIZED, FOR SOLUTION INTRAVENOUS at 21:59

## 2020-11-16 RX ADMIN — METHYLPREDNISOLONE SODIUM SUCCINATE SCH MG: 40 INJECTION, POWDER, FOR SOLUTION INTRAMUSCULAR; INTRAVENOUS at 22:00

## 2020-11-16 RX ADMIN — Medication SCH ML: at 10:20

## 2020-11-16 RX ADMIN — SODIUM CHLORIDE SCH ML: 9 INJECTION, SOLUTION INTRAVENOUS at 21:59

## 2020-11-16 RX ADMIN — INSULIN LISPRO SCH UNIT: 100 INJECTION, SOLUTION INTRAVENOUS; SUBCUTANEOUS at 13:19

## 2020-11-16 RX ADMIN — CLOPIDOGREL BISULFATE SCH MG: 75 TABLET ORAL at 09:42

## 2020-11-16 RX ADMIN — INSULIN LISPRO SCH UNIT: 100 INJECTION, SOLUTION INTRAVENOUS; SUBCUTANEOUS at 22:28

## 2020-11-16 RX ADMIN — METHYLPREDNISOLONE SODIUM SUCCINATE SCH MG: 40 INJECTION, POWDER, FOR SOLUTION INTRAMUSCULAR; INTRAVENOUS at 06:22

## 2020-11-16 RX ADMIN — INSULIN LISPRO SCH UNIT: 100 INJECTION, SOLUTION INTRAVENOUS; SUBCUTANEOUS at 08:46

## 2020-11-16 RX ADMIN — Medication SCH ML: at 22:02

## 2020-11-16 NOTE — PROGRESS NOTE
Assessment and Plan





Cultures:


Blood culture no growth today


SARS CoV2 PCR positive


 


Assessment: 80 years old female with history of hyperlipidemia, hypertension, 

dementia, breast cancer, CVA, admitted on secondary to altered mental status 

confusion for unknown duration:





#Severe sepsis: Present on admission with fever, tachycardia, hypoxia, elevated 

lactate likely due to bilateral pneumonia. 





#Severe COVID pneumonia: Elevated D-dimer.  CTA showed bilateral infiltrates.





#Acute hypoxemic respiratory failure: remains on oxygen.





Recommendations:


-Continue steroids x 10 days


-Continue remdesivir x 5 days


-Monitor inflammatory markers - ferritin, Ddimer, CRP, LDH


-Monitor liver function test on Remdesivir


-Continue anticoagulation per System Protocol 


-f/u SARS CoV-2 IgG to determine if patient is a candidate for COVID 

convalescent plasma








Derik Benavidez MD, FACP


[facility] ([facility])


O: [phone]


F: [phone]





Subjective


Date of service: 11/16/20


Principal diagnosis: Respiratory failure with hypoxia, bilateral pneumonia


Interval history: 


Afebrile.  Remains on oxygen with high requirements.








Objective





- Exam


Narrative Exam: 





Physical Exam (reviewed in chart due to PPE conservation and minimize risk of 

transmission)


Constitutional: limited due to PPE conservation strategy


Head, Ears, Nose: limited due to PPE conservation strategy


Eyes: limited due to PPE conservation strategy


Neck: limited due to PPE conservation strategy


Oral: limited due to PPE conservation strategy


Cardiovascular: limited due to PPE conservation strategy


Respiratory: limited due to PPE conservation strategy


GI: limited due to PPE conservation strategy


Musculoskeletal: limited due to PPE conservation strategy


Skin: limited due to PPE conservation strategy


Hem/Lymphatic: limited due to PPE conservation strategy


Psych: limited due to PPE conservation strategy


Neurological: limited due to PPE conservation strategy





- Constitutional


Vitals: 


                                   Vital Signs











Temp Pulse Resp BP Pulse Ox


 


 97.7 F   64   18   129/68   99 


 


 11/16/20 15:34  11/16/20 15:34  11/16/20 15:34  11/16/20 15:34  11/16/20 15:34








                           Temperature -Last 24 Hours











Temperature                    97.7 F


 


Temperature                    97.5 F


 


Temperature                    98.7 F

















- Labs


CBC & Chem 7: 


                                 11/12/20 05:43





                                 11/12/20 05:43


Labs: 


                              Abnormal lab results











  11/15/20 11/15/20 11/16/20 Range/Units





  17:05 23:55 08:55 


 


POC Glucose  167 H  203 H  200 H  ()  mg/dL














  11/16/20 Range/Units





  14:19 


 


POC Glucose  226 H  ()  mg/dL

## 2020-11-17 RX ADMIN — METHYLPREDNISOLONE SODIUM SUCCINATE SCH MG: 40 INJECTION, POWDER, FOR SOLUTION INTRAMUSCULAR; INTRAVENOUS at 14:14

## 2020-11-17 RX ADMIN — LOSARTAN POTASSIUM SCH MG: 25 TABLET, FILM COATED ORAL at 10:05

## 2020-11-17 RX ADMIN — MEMANTINE HYDROCHLORIDE SCH MG: 10 TABLET ORAL at 21:51

## 2020-11-17 RX ADMIN — INSULIN LISPRO SCH UNIT: 100 INJECTION, SOLUTION INTRAVENOUS; SUBCUTANEOUS at 17:01

## 2020-11-17 RX ADMIN — Medication SCH ML: at 21:53

## 2020-11-17 RX ADMIN — HEPARIN SODIUM SCH UNIT: 5000 INJECTION, SOLUTION INTRAVENOUS; SUBCUTANEOUS at 21:52

## 2020-11-17 RX ADMIN — METHYLPREDNISOLONE SODIUM SUCCINATE SCH MG: 40 INJECTION, POWDER, FOR SOLUTION INTRAMUSCULAR; INTRAVENOUS at 05:27

## 2020-11-17 RX ADMIN — INSULIN LISPRO SCH UNIT: 100 INJECTION, SOLUTION INTRAVENOUS; SUBCUTANEOUS at 12:14

## 2020-11-17 RX ADMIN — INSULIN LISPRO SCH UNIT: 100 INJECTION, SOLUTION INTRAVENOUS; SUBCUTANEOUS at 22:49

## 2020-11-17 RX ADMIN — CLOPIDOGREL BISULFATE SCH MG: 75 TABLET ORAL at 10:05

## 2020-11-17 RX ADMIN — HEPARIN SODIUM SCH UNIT: 5000 INJECTION, SOLUTION INTRAVENOUS; SUBCUTANEOUS at 10:09

## 2020-11-17 RX ADMIN — MEMANTINE HYDROCHLORIDE SCH MG: 10 TABLET ORAL at 10:07

## 2020-11-17 RX ADMIN — INSULIN LISPRO SCH UNIT: 100 INJECTION, SOLUTION INTRAVENOUS; SUBCUTANEOUS at 08:28

## 2020-11-17 RX ADMIN — Medication SCH ML: at 10:08

## 2020-11-17 RX ADMIN — METHYLPREDNISOLONE SODIUM SUCCINATE SCH MG: 40 INJECTION, POWDER, FOR SOLUTION INTRAMUSCULAR; INTRAVENOUS at 21:53

## 2020-11-17 NOTE — PROGRESS NOTE
Assessment and Plan





Assessment and Plan


--COVID-19 test positive 11/12/2020





-- Acute respiratory failure with hypoxia


Current Visit: Yes   Status: Acute   


Plan to address problem: 


Secondary to multifocal pneumonia 


requiring high flow oxygen 35 L /50% FiO2 telemetry yesterday


Titrate and wean as tolerated,Supportive care


Today patient is on 3 L nasal cannula oxygen





--COVID-19 multifocal pneumonia


Current Visit: Yes   Status: Acute   


Plan to address problem: 


ID recommendations appreciated


Patient on IV  methylprednisone in place of IV Decadron


Patient got remdesivir only on the 12th


We will discuss with ID and restart remdesivir





--Sepsis


Current Visit: Yes   Status: Acute   


Plan to address problem:


 Severe sepsis: Present on admission with fever, tachycardia, hypoxia, elevated 

lactate likely due to bilateral pneumonia. 











--DVT prophylaxis


Current Visit: Yes   Status: Acute   


Plan to address problem: 


SCD /SQ heparin





--Full CODE STATUS


Current Visit: Yes   Status: Acute   


Plan to address problem: 


Plan of care reviewed with the patient and her nurse


Follow ID evaluation and recommendations.








Weaning of oxygen in progress








Subjective


Date of service: 11/16/20


Principal diagnosis: Respiratory failure with hypoxia, bilateral pneumonia


Interval history: 


Brief history;


80-year-old female patient with multiple medical problems was admitted through 

emergency room with altered level of consciousness at severe weakness


Fever and hypoxemia.  Patient was PUI placed in isolation tested for corona PCR,

positive for COVID-19 on 11/12/2020, consulted ID, started treatment per COVID-

19 protocols.  Currently patient is requiring high flow oxygen, ID following, on

Solu-Medrol and remdesivir, following inflammatory markers per protocol.


Very poor prognosis.  Follow ID recommendations








Isolation precautions and PPE protocols strictly followed


Patient is critically ill short of breath


Afebrile, vital signs reviewed





Day #4 11/14/2020


Patient still on high flow oxygen





Day #5 11/15/2020


Patient on 3 L nasal cannula oxygen which is an improvement from yesterday





Day #6 11/16/2020


Patient on 3 L nasal cannula oxygen





























Objective





- Constitutional


Vitals: 


                               Vital Signs - 12hr











  11/16/20 11/16/20 11/16/20





  19:27 22:55 23:15


 


Temperature 98.4 F 97.8 F 


 


Pulse Rate 85 83 


 


Respiratory 20  





Rate   


 


Blood Pressure 145/74  


 


Blood Pressure  141/67 





[Left]   


 


O2 Sat by Pulse 96 90 99





Oximetry   














  11/17/20





  04:19


 


Temperature 97.7 F


 


Pulse Rate 59 L


 


Respiratory 18





Rate 


 


Blood Pressure 142/67


 


Blood Pressure 





[Left] 


 


O2 Sat by Pulse 98





Oximetry 











General appearance: Present: no acute distress, well-nourished





- EENT


Eyes: PERRL, EOM intact


ENT: hearing intact, clear oral mucosa


Ears: bilateral: normal





- Neck


Neck: supple, normal ROM





- Respiratory


Respiratory effort: normal


Respiratory: bilateral: CTA





- Breasts


Breasts: normal





- Cardiovascular


Rhythm: regular


Heart Sounds: Present: S1 & S2.  Absent: gallop, rub


Extremities: pulses intact, No edema, normal color, Full ROM





- Gastrointestinal


General gastrointestinal: Present: soft, non-tender, non-distended, normal bowel

 sounds





- Genitourinary


Female genitourinary: normal





- Integumentary


Integumentary: clear, warm, dry





- Musculoskeletal


Musculoskeletal: 1, strength equal bilaterally





- Neurologic


Neurologic: moves all extremities





- Psychiatric


Psychiatric: memory intact, appropriate mood/affect, intact judgment & insight





- Labs


CBC & Chem 7: 


                                 11/12/20 05:43





                                 11/12/20 05:43


Labs: 


                              Abnormal lab results











  11/16/20 11/16/20 11/16/20 Range/Units





  08:55 14:19 18:29 


 


POC Glucose  200 H  226 H   ()  mg/dL


 


SARS-CoV-2 IgG Ab    Reactive A  (NonReactive)  














  11/16/20 11/16/20 Range/Units





  19:49 22:42 


 


POC Glucose  283 H  202 H  ()  mg/dL


 


SARS-CoV-2 IgG Ab    (NonReactive)  














HEART Score





- HEART Score


Troponin: 


                                        











Troponin T  0.048 ng/mL (0.00-0.029)  H  11/11/20  10:17

## 2020-11-17 NOTE — PROGRESS NOTE
Assessment and Plan





Cultures:


Blood culture no growth today


SARS CoV2 PCR positive


 


Assessment: 80 years old female with history of hyperlipidemia, hypertension, 

dementia, breast cancer, CVA, admitted on secondary to altered mental status 

confusion for unknown duration:





#Severe sepsis: Present on admission with fever, tachycardia, hypoxia, elevated 

lactate likely due to bilateral pneumonia. 





#Severe COVID pneumonia: Elevated D-dimer.  CTA showed bilateral infiltrates. 

SARS CoV-2 IgG is positive, not a candidate for COVID convalescent plasma. 

Completed Remdesivir.





#Acute hypoxemic respiratory failure: remains on oxygen.





Recommendations:


-Continue steroids x 10 days


-Completed remdesivir


-Monitor inflammatory markers - ferritin, Ddimer, CRP, LDH


-Continue anticoagulation per System Protocol 








Derik Benavidez MD, FACP


[facility] ([facility])


O: [phone]


F: [phone]





Subjective


Date of service: 11/17/20


Principal diagnosis: Respiratory failure with hypoxia, bilateral pneumonia


Interval history: 


Afebrile.  Remains on oxygen with high requirements





Objective





- Exam


Narrative Exam: 





Physical Exam (reviewed in chart due to PPE conservation and minimize risk of 

transmission)


Constitutional: limited due to PPE conservation strategy


Head, Ears, Nose: limited due to PPE conservation strategy


Eyes: limited due to PPE conservation strategy


Neck: limited due to PPE conservation strategy


Oral: limited due to PPE conservation strategy


Cardiovascular: limited due to PPE conservation strategy


Respiratory: limited due to PPE conservation strategy


GI: limited due to PPE conservation strategy


Musculoskeletal: limited due to PPE conservation strategy


Skin: limited due to PPE conservation strategy


Hem/Lymphatic: limited due to PPE conservation strategy


Psych: limited due to PPE conservation strategy


Neurological: limited due to PPE conservation strategy 





- Constitutional


Vitals: 


                                   Vital Signs











Temp Pulse Resp BP Pulse Ox


 


 97.8 F   69   18   141/68   99 


 


 11/17/20 12:23  11/17/20 12:23  11/17/20 12:23  11/17/20 12:23  11/17/20 12:23








                           Temperature -Last 24 Hours











Temperature                    97.8 F


 


Temperature                    97.7 F


 


Temperature                    97.8 F


 


Temperature                    98.4 F


 


Temperature                    97.7 F

















- Labs


CBC & Chem 7: 


                                 11/12/20 05:43





                                 11/12/20 05:43


Labs: 


                              Abnormal lab results











  11/16/20 11/16/20 11/16/20 Range/Units





  18:29 19:49 22:42 


 


POC Glucose   283 H  202 H  ()  mg/dL


 


SARS-CoV-2 IgG Ab  Reactive A    (NonReactive)  














  11/17/20 11/17/20 Range/Units





  08:13 11:49 


 


POC Glucose  203 H  302 H  ()  mg/dL


 


SARS-CoV-2 IgG Ab    (NonReactive)

## 2020-11-17 NOTE — PROGRESS NOTE
Assessment and Plan





Assessment and Plan


--COVID-19 test positive 11/12/2020





-- Acute respiratory failure with hypoxia


Current Visit: Yes   Status: Acute   


Plan to address problem: 


Secondary to multifocal pneumonia 


requiring high flow oxygen 35 L /50% FiO2 telemetry yesterday


Titrate and wean as tolerated,Supportive care


Today patient is on 3 L nasal cannula oxygen





--COVID-19 multifocal pneumonia


Current Visit: Yes   Status: Acute   


Plan to address problem: 


ID recommendations appreciated


Patient on IV  methylprednisone in place of IV Decadron


Patient got remdesivir only on the 12th


We will discuss with ID and restart remdesivir





--Sepsis


Current Visit: Yes   Status: Acute   


Plan to address problem:


 Severe sepsis: Present on admission with fever, tachycardia, hypoxia, elevated 

lactate likely due to bilateral pneumonia. 











--DVT prophylaxis


Current Visit: Yes   Status: Acute   


Plan to address problem: 


SCD /SQ heparin





--Full CODE STATUS


Current Visit: Yes   Status: Acute   


Plan to address problem: 


Plan of care reviewed with the patient and her nurse


Follow ID evaluation and recommendations.








Weaning of oxygen in progress








Subjective


Date of service: 11/16/20


Principal diagnosis: Respiratory failure with hypoxia, bilateral pneumonia


Interval history: 


Brief history;


80-year-old female patient with multiple medical problems was admitted through 

emergency room with altered level of consciousness at severe weakness


Fever and hypoxemia.  Patient was PUI placed in isolation tested for corona PCR,

positive for COVID-19 on 11/12/2020, consulted ID, started treatment per COVID-

19 protocols.  Currently patient is requiring high flow oxygen, ID following, on

Solu-Medrol and remdesivir, following inflammatory markers per protocol.


Very poor prognosis.  Follow ID recommendations








Isolation precautions and PPE protocols strictly followed


Patient is critically ill short of breath


Afebrile, vital signs reviewed





Day #4 11/14/2020


Patient still on high flow oxygen





Day #5 11/15/2020


Patient on 3 L nasal cannula oxygen which is an improvement from yesterday





























Objective





- Constitutional


Vitals: 


                               Vital Signs - 12hr











  11/16/20 11/16/20 11/16/20





  19:27 22:55 23:15


 


Temperature 98.4 F 97.8 F 


 


Pulse Rate 85 83 


 


Respiratory 20  





Rate   


 


Blood Pressure 145/74  


 


Blood Pressure  141/67 





[Left]   


 


O2 Sat by Pulse 96 90 99





Oximetry   














  11/17/20





  04:19


 


Temperature 97.7 F


 


Pulse Rate 59 L


 


Respiratory 18





Rate 


 


Blood Pressure 142/67


 


Blood Pressure 





[Left] 


 


O2 Sat by Pulse 98





Oximetry 











General appearance: Present: no acute distress, well-nourished





- EENT


Eyes: PERRL, EOM intact


ENT: hearing intact, clear oral mucosa


Ears: bilateral: normal





- Neck


Neck: supple, normal ROM





- Respiratory


Respiratory effort: normal


Respiratory: bilateral: CTA





- Breasts


Breasts: normal





- Cardiovascular


Heart rate: 78


Rhythm: regular


Heart Sounds: Present: S1 & S2.  Absent: gallop, rub


Extremities: pulses intact, No edema, normal color, Full ROM





- Gastrointestinal


General gastrointestinal: Present: soft, non-tender, non-distended, normal bowel

 sounds





- Genitourinary


Female genitourinary: normal





- Integumentary


Integumentary: clear, warm, dry





- Musculoskeletal


Musculoskeletal: 1, strength equal bilaterally





- Neurologic


Neurologic: moves all extremities





- Psychiatric


Psychiatric: memory intact, appropriate mood/affect, intact judgment & insight





- Labs


CBC & Chem 7: 


                                 11/12/20 05:43





                                 11/12/20 05:43


Labs: 


                              Abnormal lab results











  11/16/20 11/16/20 11/16/20 Range/Units





  08:55 14:19 18:29 


 


POC Glucose  200 H  226 H   ()  mg/dL


 


SARS-CoV-2 IgG Ab    Reactive A  (NonReactive)  














  11/16/20 11/16/20 Range/Units





  19:49 22:42 


 


POC Glucose  283 H  202 H  ()  mg/dL


 


SARS-CoV-2 IgG Ab    (NonReactive)  














HEART Score





- HEART Score


Troponin: 


                                        











Troponin T  0.048 ng/mL (0.00-0.029)  H  11/11/20  10:17

## 2020-11-18 RX ADMIN — INSULIN LISPRO SCH: 100 INJECTION, SOLUTION INTRAVENOUS; SUBCUTANEOUS at 17:25

## 2020-11-18 RX ADMIN — CLOPIDOGREL BISULFATE SCH MG: 75 TABLET ORAL at 11:03

## 2020-11-18 RX ADMIN — METHYLPREDNISOLONE SODIUM SUCCINATE SCH: 40 INJECTION, POWDER, FOR SOLUTION INTRAMUSCULAR; INTRAVENOUS at 05:19

## 2020-11-18 RX ADMIN — INSULIN LISPRO SCH: 100 INJECTION, SOLUTION INTRAVENOUS; SUBCUTANEOUS at 07:04

## 2020-11-18 RX ADMIN — METHYLPREDNISOLONE SODIUM SUCCINATE SCH MG: 40 INJECTION, POWDER, FOR SOLUTION INTRAMUSCULAR; INTRAVENOUS at 22:04

## 2020-11-18 RX ADMIN — LOSARTAN POTASSIUM SCH MG: 25 TABLET, FILM COATED ORAL at 11:06

## 2020-11-18 RX ADMIN — HEPARIN SODIUM SCH UNIT: 5000 INJECTION, SOLUTION INTRAVENOUS; SUBCUTANEOUS at 22:03

## 2020-11-18 RX ADMIN — Medication SCH ML: at 22:04

## 2020-11-18 RX ADMIN — INSULIN LISPRO SCH UNIT: 100 INJECTION, SOLUTION INTRAVENOUS; SUBCUTANEOUS at 22:00

## 2020-11-18 RX ADMIN — HEPARIN SODIUM SCH UNIT: 5000 INJECTION, SOLUTION INTRAVENOUS; SUBCUTANEOUS at 11:06

## 2020-11-18 RX ADMIN — INSULIN LISPRO SCH UNIT: 100 INJECTION, SOLUTION INTRAVENOUS; SUBCUTANEOUS at 11:05

## 2020-11-18 RX ADMIN — MEMANTINE HYDROCHLORIDE SCH MG: 10 TABLET ORAL at 22:04

## 2020-11-18 RX ADMIN — METHYLPREDNISOLONE SODIUM SUCCINATE SCH MG: 40 INJECTION, POWDER, FOR SOLUTION INTRAMUSCULAR; INTRAVENOUS at 16:17

## 2020-11-18 RX ADMIN — Medication SCH ML: at 16:03

## 2020-11-18 RX ADMIN — MEMANTINE HYDROCHLORIDE SCH MG: 10 TABLET ORAL at 11:03

## 2020-11-18 NOTE — PROGRESS NOTE
Assessment and Plan


Cultures:


Blood culture no growth today


SARS CoV2 PCR positive


 


Assessment: 80 years old female with history of hyperlipidemia, hypertension, 

dementia, breast cancer, CVA, admitted on secondary to altered mental status 

confusion for unknown duration:





#Severe sepsis: Present on admission with fever, tachycardia, hypoxia, elevated 

lactate likely due to bilateral pneumonia. 





#Severe COVID pneumonia: Elevated D-dimer.  CTA showed bilateral infiltrates. 

SARS CoV-2 IgG is positive, not a candidate for COVID convalescent plasma. 

Completed Remdesivir.





#Acute hypoxemic respiratory failure: remains on oxygen.





Recommendations:


-Continue steroids x 10 days


-Monitor inflammatory markers - ferritin, Ddimer, CRP, LDH


-Continue anticoagulation per System Protocol 


-supportive care with oxygen weaning





Derik Benavidez MD, FACP


[facility], [place], [facility] ([facility]


O: [phone]


F: [phone]





Subjective


Date of service: 11/18/20


Principal diagnosis: Respiratory failure with hypoxia, bilateral pneumonia


Interval history: 


Afebrile.  Remains on oxygen but being weaned. Confused.





Objective





- Exam


Narrative Exam: 





Physical Exam (reviewed in chart due to PPE conservation and minimize risk of 

transmission)


Constitutional: limited due to PPE conservation strategy


Head, Ears, Nose: limited due to PPE conservation strategy


Eyes: limited due to PPE conservation strategy


Neck: limited due to PPE conservation strategy


Oral: limited due to PPE conservation strategy


Cardiovascular: limited due to PPE conservation strategy


Respiratory: limited due to PPE conservation strategy


GI: limited due to PPE conservation strategy


Musculoskeletal: limited due to PPE conservation strategy


Skin: limited due to PPE conservation strategy


Hem/Lymphatic: limited due to PPE conservation strategy


Psych: limited due to PPE conservation strategy


Neurological: limited due to PPE conservation strategy  





- Constitutional


Vitals: 


                                   Vital Signs











Temp Pulse Resp BP Pulse Ox


 


 97.8 F   79   20   144/70   92 


 


 11/18/20 05:01  11/18/20 05:01  11/18/20 05:01  11/18/20 05:01  11/18/20 05:01








                           Temperature -Last 24 Hours











Temperature                    97.8 F

















- Labs


CBC & Chem 7: 


                                 11/12/20 05:43





                                 11/12/20 05:43


Labs: 


                              Abnormal lab results











  11/17/20 11/17/20 11/18/20 Range/Units





  17:14 22:33 09:14 


 


POC Glucose  213 H  192 H  160 H  ()  mg/dL














  11/18/20 Range/Units





  12:31 


 


POC Glucose  177 H  ()  mg/dL

## 2020-11-18 NOTE — PROGRESS NOTE
Assessment and Plan





Assessment and Plan


--COVID-19 test positive 11/12/2020





-- Acute respiratory failure with hypoxia


Current Visit: Yes   Status: Acute   


Plan to address problem: 


Secondary to multifocal pneumonia 


requiring high flow oxygen 35 L /50% FiO2 telemetry yesterday


Titrate and wean as tolerated,Supportive care


Today patient is on 4 L nasal cannula oxygen





--COVID-19 multifocal pneumonia


Current Visit: Yes   Status: Acute   


Plan to address problem: 


ID recommendations appreciated


Patient on IV  methylprednisone in place of IV Decadron





--Sepsis


Current Visit: Yes   Status: Acute   


Plan to address problem:


 Severe sepsis: Present on admission with fever, tachycardia, hypoxia, elevated 

lactate likely due to bilateral pneumonia. 











--DVT prophylaxis


Current Visit: Yes   Status: Acute   


Plan to address problem: 


SCD /SQ heparin





--Full CODE STATUS


Current Visit: Yes   Status: Acute   


Plan to address problem: 


Plan of care reviewed with the patient and her nurse


Follow ID evaluation and recommendations.





Weaning of oxygen in progress








Subjective


Date of service: 11/18/20


Principal diagnosis: Respiratory failure with hypoxia, bilateral pneumonia


Interval history: 


Brief history;


80-year-old female patient with multiple medical problems was admitted through 

emergency room with altered level of consciousness at severe weakness


Fever and hypoxemia.  Patient was PUI placed in isolation tested for corona PCR,

positive for COVID-19 on 11/12/2020, consulted ID, started treatment per COVID-

19 protocols.  Currently patient is requiring high flow oxygen, ID following, on

Solu-Medrol and remdesivir, following inflammatory markers per protocol.


Very poor prognosis.  Follow ID recommendations








Isolation precautions and PPE protocols strictly followed


Patient is critically ill short of breath


Afebrile, vital signs reviewed





Day #4 11/14/2020


Patient still on high flow oxygen





Day #5 11/15/2020


Patient on 3 L nasal cannula oxygen which is an improvement from yesterday





Day #6 11/16/2020


Patient on 3 L nasal cannula oxygen





Day #7 11/17/2020


Patient on high flow oxygen overnight





Day #8 11/18/2020


Patient on 4 L nasal cannula oxygen





























Objective





- Constitutional


Vitals: 


                               Vital Signs - 12hr











  11/18/20 11/18/20





  10:20 12:07


 


Temperature  98.4 F


 


Pulse Rate  78


 


Respiratory  20





Rate  


 


Blood Pressure  148/78


 


O2 Sat by Pulse 94 81 L





Oximetry  











General appearance: Present: no acute distress, well-nourished





- EENT


Eyes: PERRL, EOM intact


ENT: hearing intact, clear oral mucosa


Ears: bilateral: normal





- Neck


Neck: supple, normal ROM





- Respiratory


Respiratory effort: normal


Respiratory: bilateral: CTA, rhonchi (Scattered)





- Breasts


Breasts: normal





- Cardiovascular


Heart rate: 78


Rhythm: regular


Heart Sounds: Present: S1 & S2.  Absent: gallop, rub


Extremities: pulses intact, No edema, normal color, Full ROM





- Gastrointestinal


General gastrointestinal: Present: soft, non-tender, non-distended, normal bowel

sounds





- Genitourinary


Female genitourinary: normal





- Integumentary


Integumentary: clear, warm, dry





- Musculoskeletal


Musculoskeletal: 1, strength equal bilaterally





- Neurologic


Neurologic: moves all extremities





- Psychiatric


Psychiatric: memory intact, appropriate mood/affect, intact judgment & insight





- Labs


CBC & Chem 7: 


                                 11/12/20 05:43





                                 11/12/20 05:43


Labs: 


                              Abnormal lab results











  11/17/20 11/18/20 11/18/20 Range/Units





  22:33 09:14 12:31 


 


POC Glucose  192 H  160 H  177 H  ()  mg/dL














HEART Score





- HEART Score


Troponin: 


                                        











Troponin T  0.048 ng/mL (0.00-0.029)  H  11/11/20  10:17

## 2020-11-19 RX ADMIN — LOSARTAN POTASSIUM SCH MG: 25 TABLET, FILM COATED ORAL at 09:10

## 2020-11-19 RX ADMIN — METHYLPREDNISOLONE SODIUM SUCCINATE SCH MG: 40 INJECTION, POWDER, FOR SOLUTION INTRAMUSCULAR; INTRAVENOUS at 06:16

## 2020-11-19 RX ADMIN — MEMANTINE HYDROCHLORIDE SCH MG: 10 TABLET ORAL at 21:16

## 2020-11-19 RX ADMIN — INSULIN LISPRO SCH UNIT: 100 INJECTION, SOLUTION INTRAVENOUS; SUBCUTANEOUS at 19:18

## 2020-11-19 RX ADMIN — INSULIN LISPRO SCH UNIT: 100 INJECTION, SOLUTION INTRAVENOUS; SUBCUTANEOUS at 15:10

## 2020-11-19 RX ADMIN — Medication SCH ML: at 21:17

## 2020-11-19 RX ADMIN — CLOPIDOGREL BISULFATE SCH MG: 75 TABLET ORAL at 09:10

## 2020-11-19 RX ADMIN — HEPARIN SODIUM SCH UNIT: 5000 INJECTION, SOLUTION INTRAVENOUS; SUBCUTANEOUS at 21:16

## 2020-11-19 RX ADMIN — HEPARIN SODIUM SCH UNIT: 5000 INJECTION, SOLUTION INTRAVENOUS; SUBCUTANEOUS at 09:11

## 2020-11-19 RX ADMIN — Medication SCH ML: at 09:11

## 2020-11-19 RX ADMIN — MEMANTINE HYDROCHLORIDE SCH MG: 10 TABLET ORAL at 09:10

## 2020-11-19 RX ADMIN — METHYLPREDNISOLONE SODIUM SUCCINATE SCH MG: 40 INJECTION, POWDER, FOR SOLUTION INTRAMUSCULAR; INTRAVENOUS at 15:09

## 2020-11-19 RX ADMIN — INSULIN LISPRO SCH UNIT: 100 INJECTION, SOLUTION INTRAVENOUS; SUBCUTANEOUS at 09:14

## 2020-11-19 RX ADMIN — METHYLPREDNISOLONE SODIUM SUCCINATE SCH MG: 40 INJECTION, POWDER, FOR SOLUTION INTRAMUSCULAR; INTRAVENOUS at 21:16

## 2020-11-19 NOTE — DISCHARGE SUMMARY
Providers





- Providers


Date of Admission: 


11/11/20 13:51





Date of discharge: 11/20/20


Attending physician: 


PEDRO LUNDBERG





                                        





11/12/20 18:01


Consult to Physician [CONS] Routine 


   Comment: 


   Consulting Provider: FRANCESCA ALDANA


   Physician Instructions: 


   Reason For Exam: COVID-19 test positive





11/19/20 09:07


Physical Therapy Evaluation and Treat [CONS] Stat 


   Comment: 


   Reason For Exam: To evaluate mobility status











Primary care physician: 


PRIMARY CARE MD








Hospitalization


Condition: Stable


Hospital course: 





Subjective


Date of service: 11/20/20


Principal diagnosis: Respiratory failure with hypoxia, bilateral pneumonia


Interval history: 


Brief history;


80-year-old female patient with multiple medical problems was admitted through 

emergency room with altered level of consciousness at severe weakness


Fever and hypoxemia.  Patient was PUI placed in isolation tested for corona PCR,

 positive for COVID-19 on 11/12/2020, consulted ID, started treatment per COVID-

19 protocols.  Currently patient is requiring high flow oxygen, ID following, on

 Solu-Medrol and remdesivir, following inflammatory markers per protocol.


Very poor prognosis.  Follow ID recommendations








Isolation precautions and PPE protocols strictly followed


Patient is critically ill short of breath


Afebrile, vital signs reviewed





Day #4 11/14/2020


Patient still on high flow oxygen





Day #5 11/15/2020


Patient on 3 L nasal cannula oxygen which is an improvement from yesterday





Day #6 11/16/2020


Patient on 3 L nasal cannula oxygen





Day #7 11/17/2020


Patient on high flow oxygen overnight





Day #8 11/18/2020


Patient on 4 L nasal cannula oxygen





Day #9 11/19/2020


Patient on 3 L nasal cannula oxygen


Probable discharge tomorrow





11/20/2020


Home oxygen arranged and patient discharged on 3 L nasal cannula oxyge


+





Assessment and Plan


--COVID-19 test positive 11/12/2020





-- Acute respiratory failure with hypoxia


Current Visit: Yes   Status: Acute   


Plan to address problem: 


Secondary to multifocal pneumonia 


requiring high flow oxygen 35 L /50% FiO2 telemetry yesterday


Titrate and wean as tolerated,Supportive care


Today patient is on 3 L nasal cannula oxygen





--COVID-19 multifocal pneumonia


Current Visit: Yes   Status: Acute   


Plan to address problem: 


ID recommendations appreciated


Patient on IV  methylprednisone in place of IV Decadron





--Sepsis


Current Visit: Yes   Status: Acute   


Plan to address problem:


 Severe sepsis: Present on admission with fever, tachycardia, hypoxia, elevated 

lactate likely due to bilateral pneumonia. 








Patient discharged on 3 L nasal cannula home oxygen


Patient doing well

















Disposition: DC-01 TO HOME OR SELFCARE





- Discharge Diagnoses


(1) Acute respiratory failure with hypoxia


Status: Acute   





(2) Multifocal pneumonia


Status: Acute   





(3) Suspected COVID-19 virus infection


Status: Acute   





(4) Systemic inflammatory response syndrome


Status: Acute   





Core Measure Documentation





- Palliative Care


Palliative Care/ Comfort Measures: Not Applicable





- Core Measures


Any of the following diagnoses?: none





Exam





- Constitutional


Vitals: 


                                        











Temp Pulse Resp BP Pulse Ox


 


 98.8 F   64   18   133/67   93 


 


 11/19/20 18:30  11/19/20 18:30  11/19/20 18:30  11/19/20 18:30  11/19/20 09:50











General appearance: Present: no acute distress, well-nourished





- EENT


Eyes: Present: PERRL


ENT: hearing intact, clear oral mucosa





- Neck


Neck: Present: supple, normal ROM





- Respiratory


Respiratory effort: normal


Respiratory: bilateral: CTA





- Cardiovascular


Heart Sounds: Present: S1 & S2.  Absent: rub, click





- Extremities


Extremities: pulses symmetrical, No edema


Peripheral Pulses: within normal limits





- Abdominal


General gastrointestinal: Present: soft, non-tender, non-distended, normal bowel

 sounds


Female genitourinary: Present: normal





- Integumentary


Integumentary: Present: clear, warm, dry





- Musculoskeletal


Musculoskeletal: gait normal, strength equal bilaterally





- Psychiatric


Psychiatric: appropriate mood/affect, intact judgment & insight





- Neurologic


Neurologic: CNII-XII intact, moves all extremities





Plan


Activity: no restrictions


Diet: low salt


Follow up with: 


PRIMARY CARE,MD [Primary Care Provider] - 7 Days


FRANCESCA ALDANA MD [Staff Physician] - 7 Days


Prescriptions: 


RX: Anastrozole 1 mg PO DAILY #30


RX: Fluticasone/Vilanterol [Breo Ellipta 100-25 Mcg INH] 1 spray IH Q4HR #1


RX: Metformin HCl [Glucophage Xr] 500 mg PO BID #60


RX: AtorvaSTATin [Lipitor] 20 mg PO DAILY #30


RX: Memantine HCl 10 mg PO BID #60


RX: Amlodipine Besylate [Norvasc] 10 mg PO DAILY #30


RX: Clopidogrel [Plavix] 75 mg PO DAILY #30


RX: Quetiapine Fumarate [SEROquel XR] 25 mg PO QDAY #30


RX: Tramadol HCl 50 mg PO BID PRN #20


 PRN Reason: Pain , Severe (7-10)

## 2020-11-19 NOTE — PROGRESS NOTE
Assessment and Plan


Cultures:


Blood culture no growth today


SARS CoV2 PCR positive


 


Assessment: 80 years old female with history of hyperlipidemia, hypertension, 

dementia, breast cancer, CVA, admitted on secondary to altered mental status 

confusion for unknown duration:





#Severe sepsis: Present on admission with fever, tachycardia, hypoxia, elevated 

lactate likely due to bilateral pneumonia. 





#Severe COVID pneumonia: Elevated D-dimer.  CTA showed bilateral infiltrates. 

SARS CoV-2 IgG is positive, not a candidate for COVID convalescent plasma. 

Completed Remdesivir.





#Acute hypoxemic respiratory failure: remains on oxygen.





Recommendations:


-Continue steroids x 10 days (on Solumedrol, may need taper)


-Monitor inflammatory markers - ferritin, Ddimer, CRP, LDH


-Continue anticoagulation per System Protocol 





Remains stable from ID standpoint. Will sign off. Please call with questions.





Derik Benavidez MD, FACP


[facility] ([facility])


O: [phone]


F: [phone] 





Subjective


Date of service: 11/19/20


Principal diagnosis: Respiratory failure with hypoxia, bilateral pneumonia


Interval history: 


Afebrile.  Remains on oxygen but being weaned.





Objective





- Exam


Narrative Exam: 





Physical Exam (reviewed in chart due to PPE conservation and minimize risk of 

transmission)


Constitutional: limited due to PPE conservation strategy


Head, Ears, Nose: limited due to PPE conservation strategy


Eyes: limited due to PPE conservation strategy


Neck: limited due to PPE conservation strategy


Oral: limited due to PPE conservation strategy


Cardiovascular: limited due to PPE conservation strategy


Respiratory: limited due to PPE conservation strategy


GI: limited due to PPE conservation strategy


Musculoskeletal: limited due to PPE conservation strategy


Skin: limited due to PPE conservation strategy


Hem/Lymphatic: limited due to PPE conservation strategy


Psych: limited due to PPE conservation strategy


Neurological: limited due to PPE conservation strategy   





- Constitutional


Vitals: 


                                   Vital Signs











Temp Pulse Resp BP Pulse Ox


 


 97.9 F   57 L  16   132/59   90 


 


 11/19/20 13:00  11/19/20 13:00  11/19/20 13:00  11/19/20 13:00  11/18/20 21:58








                           Temperature -Last 24 Hours











Temperature                    97.9 F


 


Temperature                    99.0 F

















- Labs


CBC & Chem 7: 


                                 11/12/20 05:43





                                 11/12/20 05:43


Labs: 


                              Abnormal lab results











  11/18/20 11/19/20 Range/Units





  22:14 12:32 


 


POC Glucose  176 H  208 H  ()  mg/dL

## 2020-11-19 NOTE — PROGRESS NOTE
Assessment and Plan





Assessment and Plan


--COVID-19 test positive 11/12/2020





-- Acute respiratory failure with hypoxia


Current Visit: Yes   Status: Acute   


Plan to address problem: 


Secondary to multifocal pneumonia 


requiring high flow oxygen 35 L /50% FiO2 telemetry yesterday


Titrate and wean as tolerated,Supportive care


Today patient is on 3 L nasal cannula oxygen





--COVID-19 multifocal pneumonia


Current Visit: Yes   Status: Acute   


Plan to address problem: 


ID recommendations appreciated


Patient on IV  methylprednisone in place of IV Decadron





--Sepsis


Current Visit: Yes   Status: Acute   


Plan to address problem:


 Severe sepsis: Present on admission with fever, tachycardia, hypoxia, elevated 

lactate likely due to bilateral pneumonia. 











--DVT prophylaxis


Current Visit: Yes   Status: Acute   


Plan to address problem: 


SCD /SQ heparin





--Full CODE STATUS


Current Visit: Yes   Status: Acute   


Plan to address problem: 


Plan of care reviewed with the patient and her nurse


Follow ID evaluation and recommendations.





Weaning of oxygen in progress


Possible discharge tomorrow








Subjective


Date of service: 11/19/20


Principal diagnosis: Respiratory failure with hypoxia, bilateral pneumonia


Interval history: 


Brief history;


80-year-old female patient with multiple medical problems was admitted through 

emergency room with altered level of consciousness at severe weakness


Fever and hypoxemia.  Patient was PUI placed in isolation tested for corona PCR,

positive for COVID-19 on 11/12/2020, consulted ID, started treatment per COVID-

19 protocols.  Currently patient is requiring high flow oxygen, ID following, on

Solu-Medrol and remdesivir, following inflammatory markers per protocol.


Very poor prognosis.  Follow ID recommendations








Isolation precautions and PPE protocols strictly followed


Patient is critically ill short of breath


Afebrile, vital signs reviewed





Day #4 11/14/2020


Patient still on high flow oxygen





Day #5 11/15/2020


Patient on 3 L nasal cannula oxygen which is an improvement from yesterday





Day #6 11/16/2020


Patient on 3 L nasal cannula oxygen





Day #7 11/17/2020


Patient on high flow oxygen overnight





Day #8 11/18/2020


Patient on 4 L nasal cannula oxygen





Day #9 11/19/2020


Patient on 3 L nasal cannula oxygen


Probable discharge tomorrow





























Objective





- Constitutional


Vitals: 


                               Vital Signs - 12hr











  11/19/20 11/19/20 11/19/20





  09:10 09:50 13:00


 


Temperature   97.9 F


 


Pulse Rate   57 L


 


Respiratory   16





Rate   


 


Blood Pressure 145/66  


 


Blood Pressure   132/59





[Left]   


 


O2 Sat by Pulse  93 





Oximetry   














  11/19/20





  18:30


 


Temperature 98.8 F


 


Pulse Rate 64


 


Respiratory 18





Rate 


 


Blood Pressure 


 


Blood Pressure 133/67





[Left] 


 


O2 Sat by Pulse 





Oximetry 











General appearance: Present: no acute distress, well-nourished





- EENT


Eyes: PERRL, EOM intact


ENT: hearing intact, clear oral mucosa


Ears: bilateral: normal





- Neck


Neck: supple, normal ROM





- Respiratory


Respiratory effort: normal


Respiratory: bilateral: CTA





- Breasts


Breasts: normal





- Cardiovascular


Rhythm: regular


Heart Sounds: Present: S1 & S2.  Absent: gallop, rub


Extremities: pulses intact, No edema, normal color, Full ROM





- Gastrointestinal


General gastrointestinal: Present: soft, non-tender, non-distended, normal bowel

 sounds





- Genitourinary


Female genitourinary: normal





- Integumentary


Integumentary: clear, warm, dry





- Musculoskeletal


Musculoskeletal: 1, strength equal bilaterally





- Neurologic


Neurologic: moves all extremities





- Psychiatric


Psychiatric: memory intact, appropriate mood/affect, intact judgment & insight





- Labs


CBC & Chem 7: 


                                 11/12/20 05:43





                                 11/12/20 05:43


Labs: 


                              Abnormal lab results











  11/18/20 11/19/20 11/19/20 Range/Units





  22:14 12:32 17:48 


 


POC Glucose  176 H  208 H  244 H  ()  mg/dL














HEART Score





- HEART Score


Troponin: 


                                        











Troponin T  0.048 ng/mL (0.00-0.029)  H  11/11/20  10:17

## 2020-11-20 VITALS — SYSTOLIC BLOOD PRESSURE: 136 MMHG | DIASTOLIC BLOOD PRESSURE: 47 MMHG

## 2020-11-20 RX ADMIN — INSULIN LISPRO SCH UNIT: 100 INJECTION, SOLUTION INTRAVENOUS; SUBCUTANEOUS at 00:30

## 2020-11-20 RX ADMIN — Medication SCH ML: at 13:36

## 2020-11-20 RX ADMIN — METHYLPREDNISOLONE SODIUM SUCCINATE SCH MG: 40 INJECTION, POWDER, FOR SOLUTION INTRAMUSCULAR; INTRAVENOUS at 05:20

## 2020-11-20 RX ADMIN — INSULIN LISPRO SCH UNIT: 100 INJECTION, SOLUTION INTRAVENOUS; SUBCUTANEOUS at 11:31

## 2020-11-20 RX ADMIN — LOSARTAN POTASSIUM SCH MG: 25 TABLET, FILM COATED ORAL at 11:12

## 2020-11-20 RX ADMIN — METHYLPREDNISOLONE SODIUM SUCCINATE SCH MG: 40 INJECTION, POWDER, FOR SOLUTION INTRAMUSCULAR; INTRAVENOUS at 13:32

## 2020-11-20 RX ADMIN — INSULIN LISPRO SCH UNIT: 100 INJECTION, SOLUTION INTRAVENOUS; SUBCUTANEOUS at 07:30

## 2020-11-20 RX ADMIN — MEMANTINE HYDROCHLORIDE SCH MG: 10 TABLET ORAL at 11:12

## 2020-11-20 RX ADMIN — HEPARIN SODIUM SCH UNIT: 5000 INJECTION, SOLUTION INTRAVENOUS; SUBCUTANEOUS at 11:13

## 2020-11-20 RX ADMIN — CLOPIDOGREL BISULFATE SCH MG: 75 TABLET ORAL at 11:12

## 2020-12-12 ENCOUNTER — HOSPITAL ENCOUNTER (EMERGENCY)
Dept: HOSPITAL 5 - ED | Age: 80
End: 2020-12-12
Payer: MEDICARE

## 2020-12-12 DIAGNOSIS — I46.9: Primary | ICD-10-CM

## 2020-12-12 PROCEDURE — 82962 GLUCOSE BLOOD TEST: CPT

## 2020-12-12 PROCEDURE — 99285 EMERGENCY DEPT VISIT HI MDM: CPT

## 2020-12-12 PROCEDURE — 92950 HEART/LUNG RESUSCITATION CPR: CPT

## 2020-12-12 NOTE — EMERGENCY DEPARTMENT REPORT
ED CPR HPI





- General


Chief Complaint: Cardiac Arrest/CPR


Stated Complaint: CARDIAC ARREST


Time Seen by Provider: 20 02:04


Source: EMS


Mode of arrival: Stretcher


Limitations: Altered Mental Status, Physical Limitation





- History of Present Illness


Initial Comments: 





Patient is a 80-year-old female that presents emergency room for cardiac arrest.

 Patient brought in by EMS.  EMS report received.  Patient was intubated by EMS.

 ET tube placement verified with bilateral breath sounds.  EMS gave the patient 

2 rounds of epi and delivered 1 shock for presumed V. fib.





Patient downtime is unknown.








MD Complaint: found unresponsive


-: minute(s)


Place: home


Bystander CPR Performed: No


AED Applied by Bystander/: No


Shock Advised: No


Initial Findings in the Field: unresponsive, no respirations, no pulse


ROSC in the Field: No


Associated Injuries: No


Treatments Prior to Arrival: intubation, BMV, chest compressions, defribrillated

shocks #, epinephrine mgs #





- Related Data


                                Home Medications











 Medication  Instructions  Recorded  Confirmed  Last Taken


 


Losartan Potassium 25 mg PO DAILY 20 Unknown








                                  Previous Rx's











 Medication  Instructions  Recorded  Last Taken  Type


 


ALBUTEROL NEB's [Proventil 0.083% 2.5 mg IH Q4HRT PRN  nebu 20 Unknown Rx





NEBS]    


 


Amlodipine Besylate [Norvasc] 10 mg PO DAILY #30 20 Unknown Rx


 


Anastrozole 1 mg PO DAILY #30 20 Unknown Rx


 


Anastrozole [Anastrozole] 1 mg PO DAILY 20 Unknown Rx


 


AtorvaSTATin [Lipitor] 20 mg PO DAILY #30 20 Unknown Rx


 


Clopidogrel [Plavix] 75 mg PO DAILY #30 20 Unknown Rx


 


Fluticasone/Vilanterol [Breo 1 spray IH Q4HR #1 20 Unknown Rx





Ellipta 100-25 Mcg INH]    


 


Memantine HCl 10 mg PO BID #60 20 Unknown Rx


 


Metformin HCl [Glucophage Xr] 500 mg PO BID #60 20 Unknown Rx


 


Quetiapine Fumarate [SEROquel XR] 25 mg PO QDAY #30 20 Unknown Rx


 


Tramadol HCl 50 mg PO BID PRN #20 20 Unknown Rx


 


amLODIPine 10 mg PO DAILY  tablet 20 Unknown Rx











                                    Allergies











Allergy/AdvReac Type Severity Reaction Status Date / Time


 


No Known Allergies Allergy   Verified 20 09:25














ED Review of Systems


ROS: 


Stated complaint: CARDIAC ARREST


Other details as noted in HPI





Comment: Unobtainable due to pts medical conditions





ED Past Medical Hx





- Past Medical History


Previous Medical History?: Yes


Hx Hypertension: Yes (FOR 10 YRS, DR. OLIVARES- PCP)


Hx Congestive Heart Failure: No


Hx Diabetes: Yes


Hx Sickle Cell Disease: No


Hx Arthritis: Yes (IN KNEES)


Hx Asthma: No


Hx COPD: No


Hx Dementia: Yes


Hx HIV: No





- Surgical History


Past Surgical History?: Yes


Hx Breast Surgery: Yes (LEFT BREAST BX 10/2016)





- Family History


Family history: no significant





- Social History


Smoking Status: Never Smoker


Substance Use Type: None





- Medications


Home Medications: 


                                Home Medications











 Medication  Instructions  Recorded  Confirmed  Last Taken  Type


 


Losartan Potassium 25 mg PO DAILY 20 Unknown History


 


ALBUTEROL NEB's [Proventil 0.083% 2.5 mg IH Q4HRT PRN  nebu 20  Unknown Rx





NEBS]     


 


Amlodipine Besylate [Norvasc] 10 mg PO DAILY #30 20  Unknown Rx


 


Anastrozole 1 mg PO DAILY #30 20  Unknown Rx


 


Anastrozole [Anastrozole] 1 mg PO DAILY 20  Unknown Rx


 


AtorvaSTATin [Lipitor] 20 mg PO DAILY #30 20  Unknown Rx


 


Clopidogrel [Plavix] 75 mg PO DAILY #30 20  Unknown Rx


 


Fluticasone/Vilanterol [Breo 1 spray IH Q4HR #1 20  Unknown Rx





Ellipta 100-25 Mcg INH]     


 


Memantine HCl 10 mg PO BID #60 20  Unknown Rx


 


Metformin HCl [Glucophage Xr] 500 mg PO BID #60 20  Unknown Rx


 


Quetiapine Fumarate [SEROquel XR] 25 mg PO QDAY #30 20  Unknown Rx


 


Tramadol HCl 50 mg PO BID PRN #20 20  Unknown Rx


 


amLODIPine 10 mg PO DAILY  tablet 20  Unknown Rx














ED Physical Exam





- General


General appearance: other





- Head


Head exam: Present: atraumatic, normocephalic





- Eye


Eye exam: Present: other (Pupils are fixed and dilated)





- ENT


ENT exam: Present: mucous membranes dry





- Neck


Neck exam: Present: normal inspection





- Respiratory


Respiratory exam: Present: decreased breath sounds, other (ET tube in place and 

placement verified with bilateral breath sounds.)





- Cardiovascular


Cardiovascular Exam: Present: other (No pulse noted)





- GI/Abdominal


GI/Abdominal exam: Present: soft





- Extremities Exam


Extremities exam: Present: normal inspection (Except for left lower extremity 

IO)





- Neurological Exam


Neurological exam: Present: altered





- Skin


Skin exam: Present: warm, dry, intact, normal color





ED Course





- Reevaluation(s)


Reevaluation #1: 


Patient arrived with EMS.  Report received from EMS.  EMS states patient is 

already been shocked once, 2 rounds of epi and intubated and has a left lower 

extremity IO.  Patient transferred to our gurney.  Placement of the ET tube 

verified.  Compressions were continued.


20 02:04








Patient had spontaneous return of circulation.  Patient will be placed on a nor 

epi drip in the meantime and an epi drip will be ordered from pharmacy.  Patient

noted to be bradycardic and the patient was given atropine.  See code note.


20 02:08








Family updated with information.


20 02:13





Reevaluation #2: 


Patient went back into a cardiac arrest.  No pulse noted.  Compressions were 

resumed.


20 02:17








Resuscitation efforts were terminated due to no signs of life.  Patient had no 

pulse.  Patient had no respiratory motion.  Patient asystole on the monitor.  

Patient had no cardiac motion.  See code note.  Code ran in accordance with ACLS

guidelines.


20 02:30








Family meeting done.  All questions and concerns were addressed with family.


20 03:12








ED Medical Decision Making





- Medical Decision Making





Patient is a 80-year-old female that presents emergency room for cardiac arrest.

 Patient brought in by EMS.  EMS placed an IO and intubate the patient.  Patient

was found down by the family for unknown amount of time.  Patient had return of 

spontaneous circulation and the ER but then went back into a cardiac arrest and 

resuscitation efforts were terminated due to no signs of life.  Multiple rounds 

of CPR and medications.  Multiple medications were given.  See code note.  Code 

ran in accordance with ACLS guidelines.














- Differential Diagnosis


Cardiac arrest, CPR, MI, PE


Critical Care Time: Yes


Critical care time in (mins) excluding proc time.: 35


Critical care attestation.: 


If time is entered above; I have spent that time in minutes in the direct care 

of this critically ill patient, excluding procedure time.





Critical Care Time: 





35 minutes











ED Disposition


Clinical Impression: 


 Cardiac arrest





Disposition: DC-20 


Is pt being admited?: No


Does the pt Need Aspirin: No


Condition: Undetermined


Time of Disposition: 03:14